# Patient Record
Sex: FEMALE | Race: BLACK OR AFRICAN AMERICAN | NOT HISPANIC OR LATINO | Employment: OTHER | URBAN - METROPOLITAN AREA
[De-identification: names, ages, dates, MRNs, and addresses within clinical notes are randomized per-mention and may not be internally consistent; named-entity substitution may affect disease eponyms.]

---

## 2019-07-24 ENCOUNTER — HOSPITAL ENCOUNTER (OUTPATIENT)
Dept: RADIOLOGY | Facility: OTHER | Age: 79
Discharge: HOME | End: 2019-07-24
Attending: INTERNAL MEDICINE
Payer: MEDICARE

## 2019-07-24 DIAGNOSIS — R10.33 ABDOMINAL CRAMPING, PERIUMBILICAL: ICD-10-CM

## 2019-07-24 DIAGNOSIS — M89.49 OSTEOARTHROSIS INVOLVING MULTIPLE SITES BUT NOT DESIGNATED AS GENERALIZED: ICD-10-CM

## 2019-07-24 DIAGNOSIS — R10.13 ABDOMINAL PAIN, EPIGASTRIC: ICD-10-CM

## 2019-07-24 DIAGNOSIS — R14.0 ABDOMINAL DISTENSION: ICD-10-CM

## 2019-07-24 DIAGNOSIS — K59.00 CN (CONSTIPATION): ICD-10-CM

## 2019-07-24 PROCEDURE — 76700 US ABDOMEN COMPLETE: ICD-10-PCS | Mod: 26,,, | Performed by: RADIOLOGY

## 2019-07-24 PROCEDURE — 72070 XR THORACIC SPINE AP LATERAL: ICD-10-PCS | Mod: 26,,, | Performed by: RADIOLOGY

## 2019-07-24 PROCEDURE — 72100 XR LUMBAR SPINE AP AND LATERAL: ICD-10-PCS | Mod: 26,,, | Performed by: RADIOLOGY

## 2019-07-24 PROCEDURE — 72070 X-RAY EXAM THORAC SPINE 2VWS: CPT | Mod: 26,,, | Performed by: RADIOLOGY

## 2019-07-24 PROCEDURE — 76700 US EXAM ABDOM COMPLETE: CPT | Mod: TC

## 2019-07-24 PROCEDURE — 72100 X-RAY EXAM L-S SPINE 2/3 VWS: CPT | Mod: TC,FY

## 2019-07-24 PROCEDURE — 72070 X-RAY EXAM THORAC SPINE 2VWS: CPT | Mod: TC,FY

## 2019-07-24 PROCEDURE — 72170 X-RAY EXAM OF PELVIS: CPT | Mod: TC,FY

## 2019-07-24 PROCEDURE — 72100 X-RAY EXAM L-S SPINE 2/3 VWS: CPT | Mod: 26,,, | Performed by: RADIOLOGY

## 2019-07-24 PROCEDURE — 72170 XR PELVIS ROUTINE AP: ICD-10-PCS | Mod: 26,,, | Performed by: RADIOLOGY

## 2019-07-24 PROCEDURE — 76700 US EXAM ABDOM COMPLETE: CPT | Mod: 26,,, | Performed by: RADIOLOGY

## 2019-07-24 PROCEDURE — 72170 X-RAY EXAM OF PELVIS: CPT | Mod: 26,,, | Performed by: RADIOLOGY

## 2021-11-30 ENCOUNTER — OFFICE VISIT (OUTPATIENT)
Dept: CARDIOLOGY | Facility: CLINIC | Age: 81
End: 2021-11-30
Attending: INTERNAL MEDICINE
Payer: MEDICARE

## 2021-11-30 VITALS
SYSTOLIC BLOOD PRESSURE: 121 MMHG | HEIGHT: 63 IN | BODY MASS INDEX: 31.64 KG/M2 | WEIGHT: 178.56 LBS | HEART RATE: 98 BPM | DIASTOLIC BLOOD PRESSURE: 71 MMHG

## 2021-11-30 DIAGNOSIS — I10 PRIMARY HYPERTENSION: ICD-10-CM

## 2021-11-30 DIAGNOSIS — E66.9 MILD OBESITY: ICD-10-CM

## 2021-11-30 DIAGNOSIS — E03.9 ACQUIRED HYPOTHYROIDISM: ICD-10-CM

## 2021-11-30 DIAGNOSIS — E04.2 MULTINODULAR GOITER (NONTOXIC): ICD-10-CM

## 2021-11-30 DIAGNOSIS — E78.00 HYPERCHOLESTEROLEMIA: ICD-10-CM

## 2021-11-30 DIAGNOSIS — R06.02 SHORTNESS OF BREATH: ICD-10-CM

## 2021-11-30 DIAGNOSIS — R73.03 PREDIABETES: ICD-10-CM

## 2021-11-30 PROCEDURE — 99204 OFFICE O/P NEW MOD 45 MIN: CPT | Mod: 25,S$GLB,, | Performed by: INTERNAL MEDICINE

## 2021-11-30 PROCEDURE — 99999 PR PBB SHADOW E&M-EST. PATIENT-LVL III: CPT | Mod: PBBFAC,,, | Performed by: INTERNAL MEDICINE

## 2021-11-30 PROCEDURE — 93000 PR ELECTROCARDIOGRAM, COMPLETE: ICD-10-PCS | Mod: S$GLB,,, | Performed by: INTERNAL MEDICINE

## 2021-11-30 PROCEDURE — 93000 ELECTROCARDIOGRAM COMPLETE: CPT | Mod: S$GLB,,, | Performed by: INTERNAL MEDICINE

## 2021-11-30 PROCEDURE — 93005 ELECTROCARDIOGRAM TRACING: CPT

## 2021-11-30 PROCEDURE — 99204 PR OFFICE/OUTPT VISIT, NEW, LEVL IV, 45-59 MIN: ICD-10-PCS | Mod: 25,S$GLB,, | Performed by: INTERNAL MEDICINE

## 2021-11-30 PROCEDURE — 99999 PR PBB SHADOW E&M-EST. PATIENT-LVL III: ICD-10-PCS | Mod: PBBFAC,,, | Performed by: INTERNAL MEDICINE

## 2021-11-30 RX ORDER — ERGOCALCIFEROL 1.25 MG/1
50000 CAPSULE ORAL
COMMUNITY

## 2021-11-30 RX ORDER — ASPIRIN 325 MG
50 TABLET, DELAYED RELEASE (ENTERIC COATED) ORAL DAILY
COMMUNITY

## 2021-11-30 RX ORDER — PLECANATIDE 3 MG/1
TABLET ORAL
COMMUNITY

## 2021-11-30 RX ORDER — POLYETHYLENE GLYCOL 3350 17 G/17G
POWDER, FOR SOLUTION ORAL
COMMUNITY

## 2021-12-02 ENCOUNTER — HOSPITAL ENCOUNTER (OUTPATIENT)
Dept: RADIOLOGY | Facility: OTHER | Age: 81
Discharge: HOME | End: 2021-12-02
Attending: INTERNAL MEDICINE
Payer: MEDICARE

## 2021-12-02 DIAGNOSIS — R06.02 SHORTNESS OF BREATH: ICD-10-CM

## 2021-12-02 PROCEDURE — 71046 XR CHEST PA AND LATERAL: ICD-10-PCS | Mod: 26,,, | Performed by: RADIOLOGY

## 2021-12-02 PROCEDURE — 71046 X-RAY EXAM CHEST 2 VIEWS: CPT | Mod: 26,,, | Performed by: RADIOLOGY

## 2021-12-02 PROCEDURE — 71046 X-RAY EXAM CHEST 2 VIEWS: CPT | Mod: TC,FY

## 2021-12-28 ENCOUNTER — HOSPITAL ENCOUNTER (OUTPATIENT)
Dept: CARDIOLOGY | Facility: OTHER | Age: 81
Discharge: HOME | End: 2021-12-28
Attending: INTERNAL MEDICINE
Payer: MEDICARE

## 2021-12-28 VITALS
WEIGHT: 178 LBS | DIASTOLIC BLOOD PRESSURE: 71 MMHG | HEIGHT: 62 IN | SYSTOLIC BLOOD PRESSURE: 121 MMHG | BODY MASS INDEX: 32.76 KG/M2

## 2021-12-28 VITALS
BODY MASS INDEX: 32.76 KG/M2 | DIASTOLIC BLOOD PRESSURE: 71 MMHG | HEART RATE: 72 BPM | SYSTOLIC BLOOD PRESSURE: 118 MMHG | WEIGHT: 178 LBS | HEIGHT: 62 IN

## 2021-12-28 DIAGNOSIS — R06.02 SHORTNESS OF BREATH: ICD-10-CM

## 2021-12-28 PROCEDURE — 93306 ECHO (CUPID ONLY): ICD-10-PCS | Mod: 26,,, | Performed by: INTERNAL MEDICINE

## 2021-12-28 PROCEDURE — 93306 TTE W/DOPPLER COMPLETE: CPT

## 2021-12-28 PROCEDURE — 93018 CV STRESS TEST I&R ONLY: CPT | Mod: ,,, | Performed by: INTERNAL MEDICINE

## 2021-12-28 PROCEDURE — 93016 EXERCISE STRESS - EKG (CUPID ONLY): ICD-10-PCS | Mod: ,,, | Performed by: INTERNAL MEDICINE

## 2021-12-28 PROCEDURE — 93306 TTE W/DOPPLER COMPLETE: CPT | Mod: 26,,, | Performed by: INTERNAL MEDICINE

## 2021-12-28 PROCEDURE — 93018 EXERCISE STRESS - EKG (CUPID ONLY): ICD-10-PCS | Mod: ,,, | Performed by: INTERNAL MEDICINE

## 2021-12-28 PROCEDURE — 93017 CV STRESS TEST TRACING ONLY: CPT

## 2021-12-28 PROCEDURE — 93016 CV STRESS TEST SUPVJ ONLY: CPT | Mod: ,,, | Performed by: INTERNAL MEDICINE

## 2022-01-03 LAB
ASCENDING AORTA: 2.59 CM
AV INDEX (PROSTH): 0.81
AV MEAN GRADIENT: 4 MMHG
AV PEAK GRADIENT: 7 MMHG
AV VALVE AREA: 2.23 CM2
AV VELOCITY RATIO: 0.89
BSA FOR ECHO PROCEDURE: 1.88 M2
CV ECHO LV RWT: 0.49 CM
CV STRESS BASE HR: 72 BPM
DIASTOLIC BLOOD PRESSURE: 71 MMHG
DOP CALC AO PEAK VEL: 1.32 M/S
DOP CALC AO VTI: 27.79 CM
DOP CALC LVOT AREA: 2.7 CM2
DOP CALC LVOT DIAMETER: 1.87 CM
DOP CALC LVOT PEAK VEL: 1.18 M/S
DOP CALC LVOT STROKE VOLUME: 61.98 CM3
DOP CALCLVOT PEAK VEL VTI: 22.58 CM
E WAVE DECELERATION TIME: 200.19 MSEC
E/A RATIO: 0.68
E/E' RATIO: 12.8 M/S
ECHO LV POSTERIOR WALL: 0.88 CM (ref 0.6–1.1)
EJECTION FRACTION: 65 %
FRACTIONAL SHORTENING: 40 % (ref 28–44)
INTERVENTRICULAR SEPTUM: 0.8 CM (ref 0.6–1.1)
IVRT: 76.12 MSEC
LA MAJOR: 3.52 CM
LA MINOR: 4.19 CM
LA WIDTH: 2.97 CM
LEFT ATRIUM SIZE: 2.72 CM
LEFT ATRIUM VOLUME INDEX MOD: 12.9 ML/M2
LEFT ATRIUM VOLUME INDEX: 14.4 ML/M2
LEFT ATRIUM VOLUME MOD: 23.45 CM3
LEFT ATRIUM VOLUME: 26.27 CM3
LEFT INTERNAL DIMENSION IN SYSTOLE: 2.17 CM (ref 2.1–4)
LEFT VENTRICLE DIASTOLIC VOLUME INDEX: 29.65 ML/M2
LEFT VENTRICLE DIASTOLIC VOLUME: 53.97 ML
LEFT VENTRICLE MASS INDEX: 46 G/M2
LEFT VENTRICLE SYSTOLIC VOLUME INDEX: 8.6 ML/M2
LEFT VENTRICLE SYSTOLIC VOLUME: 15.62 ML
LEFT VENTRICULAR INTERNAL DIMENSION IN DIASTOLE: 3.59 CM (ref 3.5–6)
LEFT VENTRICULAR MASS: 83.88 G
LV LATERAL E/E' RATIO: 13.71 M/S
LV SEPTAL E/E' RATIO: 12 M/S
MV A" WAVE DURATION": 12.46 MSEC
MV PEAK A VEL: 1.42 M/S
MV PEAK E VEL: 0.96 M/S
MV STENOSIS PRESSURE HALF TIME: 58.05 MS
MV VALVE AREA P 1/2 METHOD: 3.79 CM2
OHS CV CPX 85 PERCENT MAX PREDICTED HEART RATE MALE: 115
OHS CV CPX ESTIMATED METS: 7
OHS CV CPX MAX PREDICTED HEART RATE: 135
OHS CV CPX PATIENT IS FEMALE: 1
OHS CV CPX PATIENT IS MALE: 0
OHS CV CPX RATE PRESSURE PRODUCT PRESENTING: 8496
PISA TR MAX VEL: 2.41 M/S
PULM VEIN S/D RATIO: 1.2
PV PEAK D VEL: 0.4 M/S
PV PEAK S VEL: 0.48 M/S
PV PEAK VELOCITY: 0.98 CM/S
RA MAJOR: 3.41 CM
RA PRESSURE: 3 MMHG
RA WIDTH: 2.74 CM
RIGHT VENTRICULAR END-DIASTOLIC DIMENSION: 2.99 CM
RV TISSUE DOPPLER FREE WALL SYSTOLIC VELOCITY 1 (APICAL 4 CHAMBER VIEW): 17.06 CM/S
SINUS: 2.8 CM
STJ: 2.3 CM
STRESS ECHO POST EXERCISE DUR MIN: 5 MINUTES
STRESS ECHO POST EXERCISE DUR SEC: 15 SECONDS
SYSTOLIC BLOOD PRESSURE: 118 MMHG
TDI LATERAL: 0.07 M/S
TDI SEPTAL: 0.08 M/S
TDI: 0.08 M/S
TR MAX PG: 23 MMHG
TRICUSPID ANNULAR PLANE SYSTOLIC EXCURSION: 2.33 CM
TV REST PULMONARY ARTERY PRESSURE: 26 MMHG

## 2022-01-25 ENCOUNTER — OFFICE VISIT (OUTPATIENT)
Dept: CARDIOLOGY | Facility: CLINIC | Age: 82
End: 2022-01-25
Attending: INTERNAL MEDICINE
Payer: MEDICARE

## 2022-01-25 VITALS
BODY MASS INDEX: 33.1 KG/M2 | OXYGEN SATURATION: 98 % | DIASTOLIC BLOOD PRESSURE: 59 MMHG | HEART RATE: 81 BPM | SYSTOLIC BLOOD PRESSURE: 123 MMHG | HEIGHT: 62 IN | WEIGHT: 179.88 LBS

## 2022-01-25 DIAGNOSIS — I10 PRIMARY HYPERTENSION: ICD-10-CM

## 2022-01-25 DIAGNOSIS — E21.3 HYPERPARATHYROIDISM: ICD-10-CM

## 2022-01-25 DIAGNOSIS — E03.9 ACQUIRED HYPOTHYROIDISM: ICD-10-CM

## 2022-01-25 DIAGNOSIS — E04.2 MULTINODULAR GOITER: ICD-10-CM

## 2022-01-25 DIAGNOSIS — R06.02 SHORTNESS OF BREATH: ICD-10-CM

## 2022-01-25 DIAGNOSIS — R73.03 PREDIABETES: ICD-10-CM

## 2022-01-25 DIAGNOSIS — E78.00 HYPERCHOLESTEROLEMIA: ICD-10-CM

## 2022-01-25 DIAGNOSIS — E66.9 MILD OBESITY: ICD-10-CM

## 2022-01-25 PROCEDURE — 3078F DIAST BP <80 MM HG: CPT | Mod: CPTII,S$GLB,, | Performed by: INTERNAL MEDICINE

## 2022-01-25 PROCEDURE — 3078F PR MOST RECENT DIASTOLIC BLOOD PRESSURE < 80 MM HG: ICD-10-PCS | Mod: CPTII,S$GLB,, | Performed by: INTERNAL MEDICINE

## 2022-01-25 PROCEDURE — 1160F RVW MEDS BY RX/DR IN RCRD: CPT | Mod: CPTII,S$GLB,, | Performed by: INTERNAL MEDICINE

## 2022-01-25 PROCEDURE — 1160F PR REVIEW ALL MEDS BY PRESCRIBER/CLIN PHARMACIST DOCUMENTED: ICD-10-PCS | Mod: CPTII,S$GLB,, | Performed by: INTERNAL MEDICINE

## 2022-01-25 PROCEDURE — 3074F PR MOST RECENT SYSTOLIC BLOOD PRESSURE < 130 MM HG: ICD-10-PCS | Mod: CPTII,S$GLB,, | Performed by: INTERNAL MEDICINE

## 2022-01-25 PROCEDURE — 99999 PR PBB SHADOW E&M-EST. PATIENT-LVL III: ICD-10-PCS | Mod: PBBFAC,,, | Performed by: INTERNAL MEDICINE

## 2022-01-25 PROCEDURE — 99214 OFFICE O/P EST MOD 30 MIN: CPT | Mod: S$GLB,,, | Performed by: INTERNAL MEDICINE

## 2022-01-25 PROCEDURE — 3074F SYST BP LT 130 MM HG: CPT | Mod: CPTII,S$GLB,, | Performed by: INTERNAL MEDICINE

## 2022-01-25 PROCEDURE — 1159F MED LIST DOCD IN RCRD: CPT | Mod: CPTII,S$GLB,, | Performed by: INTERNAL MEDICINE

## 2022-01-25 PROCEDURE — 99999 PR PBB SHADOW E&M-EST. PATIENT-LVL III: CPT | Mod: PBBFAC,,, | Performed by: INTERNAL MEDICINE

## 2022-01-25 PROCEDURE — 99214 PR OFFICE/OUTPT VISIT, EST, LEVL IV, 30-39 MIN: ICD-10-PCS | Mod: S$GLB,,, | Performed by: INTERNAL MEDICINE

## 2022-01-25 PROCEDURE — 1159F PR MEDICATION LIST DOCUMENTED IN MEDICAL RECORD: ICD-10-PCS | Mod: CPTII,S$GLB,, | Performed by: INTERNAL MEDICINE

## 2022-01-25 RX ORDER — AMLODIPINE BESYLATE 5 MG/1
5 TABLET ORAL DAILY
Qty: 90 TABLET | Refills: 3 | Status: SHIPPED | OUTPATIENT
Start: 2022-01-25 | End: 2022-03-29 | Stop reason: SDUPTHER

## 2022-01-25 RX ORDER — QUINAPRIL 40 MG/1
40 TABLET ORAL DAILY
Qty: 90 TABLET | Refills: 3 | Status: SHIPPED | OUTPATIENT
Start: 2022-01-25 | End: 2022-03-29 | Stop reason: SDUPTHER

## 2022-01-25 NOTE — PROGRESS NOTES
Subjective:     Xuan Jasmine is a 81 y.o. female with hypertension, hypercholesterolemia and prediabetes. She is mildly obese. In 1994 when stationed in [place] she was diagnosed with hypothyroidism and having thyroid nodules. In 2022 she tells me she has been diagnosed with hyperparathyroidism. In 2019 she began to experinece exertional shortness of breath. On 12/28/2021 she had an echocardiogram that revealed normal left ventricular size and systolic function. The ejection fraction was 65%. The was left concentric remodeling and mild diastolic dysfunction. On 12/28/2021 she had a stress electrocardiogram and was able to do 5:15 minutes on the treadmill. She became short of breath but she had no chest pain. The electrocardiogram was negative. Accordingly it did not appear her exertional shortness of breath had a cardiac cause. She denies any chest pain, palpitations or weak spells. She is feeling well. In 2022 she is being evaluated for hyperparathyroidism.      Shortness of Breath  This is a chronic problem. The current episode started more than 1 year ago. Pertinent negatives include no abdominal pain, chest pain, claudication, coryza, ear pain, fever, headaches, hemoptysis, leg pain, leg swelling, neck pain, orthopnea, PND, rash, rhinorrhea, sore throat, sputum production, swollen glands, syncope, vomiting or wheezing.   Hypertension  This is a chronic problem. The current episode started more than 1 year ago. The problem is unchanged. The problem is controlled (usually 120-130/70-80 mmHg at home). Associated symptoms include peripheral edema and shortness of breath. Pertinent negatives include no anxiety, blurred vision, chest pain, headaches, malaise/fatigue, neck pain, orthopnea, palpitations, PND or sweats. There is no history of chronic renal disease.   Hyperlipidemia  Exacerbating diseases include hypothyroidism and obesity. She has no history of chronic renal disease, diabetes, liver disease or  nephrotic syndrome. Associated symptoms include shortness of breath. Pertinent negatives include no chest pain, focal sensory loss, focal weakness, leg pain or myalgias.       Review of Systems   Constitutional: Negative for chills, fever and malaise/fatigue.   HENT: Negative for ear pain, nosebleeds, rhinorrhea, sore throat and tinnitus.    Eyes: Negative for blurred vision, double vision, vision loss in left eye and vision loss in right eye.   Cardiovascular: Positive for dyspnea on exertion. Negative for chest pain, claudication, irregular heartbeat, leg swelling, near-syncope, orthopnea, palpitations, paroxysmal nocturnal dyspnea and syncope.   Respiratory: Positive for shortness of breath. Negative for cough, hemoptysis, sputum production and wheezing.    Endocrine: Negative for cold intolerance and heat intolerance.   Hematologic/Lymphatic: Negative for bleeding problem. Does not bruise/bleed easily.   Skin: Negative for color change and rash.   Musculoskeletal: Negative for back pain, falls, muscle weakness, myalgias and neck pain.   Gastrointestinal: Negative for abdominal pain, diarrhea, dysphagia, heartburn, hematemesis, hematochezia, hemorrhoids, jaundice, melena, nausea and vomiting.   Genitourinary: Negative for dysuria and hematuria.   Neurological: Negative for dizziness, focal weakness, headaches, light-headedness, loss of balance, numbness, tremors, vertigo and weakness.   Psychiatric/Behavioral: Negative for altered mental status, depression and memory loss. The patient is not nervous/anxious.    Allergic/Immunologic: Negative for hives and persistent infections.       Current Outpatient Medications on File Prior to Visit   Medication Sig Dispense Refill    amlodipine (NORVASC) 5 MG tablet Take 5 mg by mouth once daily.      co-enzyme Q-10 50 mg capsule Take 50 mg by mouth once daily.      ergocalciferol (ERGOCALCIFEROL) 50,000 unit Cap Take 50,000 Units by mouth every 7 days.      FLAXSEED OIL  "ORAL Take by mouth.      plecanatide (TRULANCE) 3 mg Tab Take by mouth.      polyethylene glycol (GLYCOLAX) 17 gram PwPk Take by mouth.      quinapril (ACCUPRIL) 40 MG tablet Take 40 mg by mouth once daily.       No current facility-administered medications on file prior to visit.       BP (!) 123/59 (BP Location: Left arm, Patient Position: Sitting, BP Method: Large (Automatic))   Pulse 81   Ht 5' 2" (1.575 m)   Wt 81.6 kg (179 lb 14.3 oz)   SpO2 98%   BMI 32.90 kg/m²       Objective:     Physical Exam  Constitutional:       General: She is not in acute distress.     Appearance: Normal appearance. She is well-developed. She is not toxic-appearing or diaphoretic.   HENT:      Head: Normocephalic and atraumatic.      Nose: Nose normal.   Eyes:      General:         Right eye: No discharge.         Left eye: No discharge.      Conjunctiva/sclera:      Right eye: Right conjunctiva is not injected.      Left eye: Left conjunctiva is not injected.      Pupils: Pupils are equal.      Right eye: Pupil is round.      Left eye: Pupil is round.   Neck:      Thyroid: No thyromegaly.      Vascular: No carotid bruit or JVD.   Cardiovascular:      Rate and Rhythm: Normal rate and regular rhythm.  No extrasystoles are present.     Chest Wall: PMI is not displaced.      Pulses:           Radial pulses are 2+ on the right side and 2+ on the left side.        Femoral pulses are 2+ on the right side and 2+ on the left side.       Dorsalis pedis pulses are 2+ on the right side and 2+ on the left side.        Posterior tibial pulses are 2+ on the right side and 2+ on the left side.      Heart sounds: S1 normal and S2 normal. Murmur heard.    Medium-pitched midsystolic murmur is present at the upper right sternal border.  Gallop present. S4 sounds present.    Pulmonary:      Effort: Pulmonary effort is normal.      Breath sounds: Normal breath sounds.   Abdominal:      Palpations: Abdomen is soft.      Tenderness: There is no " abdominal tenderness.   Musculoskeletal:      Cervical back: Neck supple.      Right lower leg: Normal. No swelling. No edema.      Left lower leg: Normal. No swelling. No edema.   Lymphadenopathy:      Head:      Right side of head: No submandibular adenopathy.      Left side of head: No submandibular adenopathy.      Cervical: No cervical adenopathy.   Skin:     General: Skin is warm and dry.      Findings: No rash.      Nails: There is no clubbing.   Neurological:      General: No focal deficit present.      Mental Status: She is alert and oriented to person, place, and time. She is not disoriented.      Cranial Nerves: No cranial nerve deficit.   Psychiatric:         Attention and Perception: Attention and perception normal.         Mood and Affect: Mood and affect normal.         Speech: Speech normal.         Behavior: Behavior normal.         Thought Content: Thought content normal.         Cognition and Memory: Cognition and memory normal.         Judgment: Judgment normal.         Assessment:     1. Shortness of breath    2. Primary hypertension    3. Hypercholesterolemia    4. Prediabetes    5. Mild obesity    6. Multinodular goiter    7. Acquired hypothyroidism    8. Hyperparathyroidism         Plan:     1. Shortness of Breath   : Began to experience exertional shortness of breath.   2021: Echo: Normal left ventricular size and systolic function. EF 65%. LVCR. Mild diastolic dysfunction.    2021: Stress EC:15 min. SOB. No CP. ECG negative.   2021: CXR: Negative.   Does not appear her exertional SOB has a cardiac cause.   Suspect main reason is her weight and recent weight gain.     2. Hypertension   : Diagnosed.    On amlodipine 5 mg Q24 and quinapril 40 mg Q24.   Keeping log at home.   Well controlled.    3. Hypercholesterolemia   : Diagnosed. Statin was begun.    Pravastatin was discontinued due to poor urination and chest pain.   Ezetimibe was discontinued due to chest  pain.    4. Prediabetes    2019: Diagnosed.    Diet.    5. Mild Obesity   11/30/2021: Weight 81 kg. BMI 32.   Encouraged to lose weight.    6. Thyroid Nodules   1964: Diagnosed.    Dr. Martinez at [facility].    7. Hypothyroidism   1964: Diagnosed.    Dr. Martinez at [facility].    8. Hyperparathyroidism   12/30/2021: Ca 10.5. PTH 85.   Evaluation in progress.    9. Primary Care   Dr. Roseline Amos.    F/u 4 months.    Te Gilbert M.D.      2/24/2022 4:22 PM, Addendum:    I have been informed about plans for surgery - parathyroidectomy - [facility] - Dr. Iraida Rooney.    Appears cardiac risk with planned surgery is acceptable.    Te Gilbert M.D.       Copy:    Dr. Iraida Rooney.

## 2022-03-29 ENCOUNTER — OFFICE VISIT (OUTPATIENT)
Dept: CARDIOLOGY | Facility: CLINIC | Age: 82
End: 2022-03-29
Attending: INTERNAL MEDICINE
Payer: MEDICARE

## 2022-03-29 VITALS
HEART RATE: 87 BPM | OXYGEN SATURATION: 96 % | DIASTOLIC BLOOD PRESSURE: 70 MMHG | SYSTOLIC BLOOD PRESSURE: 120 MMHG | WEIGHT: 180.31 LBS | BODY MASS INDEX: 33.18 KG/M2 | HEIGHT: 62 IN

## 2022-03-29 DIAGNOSIS — Z01.810 PRE-OPERATIVE CARDIOVASCULAR EXAMINATION: ICD-10-CM

## 2022-03-29 DIAGNOSIS — R73.03 PREDIABETES: ICD-10-CM

## 2022-03-29 DIAGNOSIS — E03.9 ACQUIRED HYPOTHYROIDISM: ICD-10-CM

## 2022-03-29 DIAGNOSIS — E78.00 HYPERCHOLESTEROLEMIA: ICD-10-CM

## 2022-03-29 DIAGNOSIS — E66.9 MILD OBESITY: ICD-10-CM

## 2022-03-29 DIAGNOSIS — R06.02 SHORTNESS OF BREATH: ICD-10-CM

## 2022-03-29 DIAGNOSIS — E21.3 HYPERPARATHYROIDISM: ICD-10-CM

## 2022-03-29 DIAGNOSIS — I10 PRIMARY HYPERTENSION: ICD-10-CM

## 2022-03-29 DIAGNOSIS — E04.2 MULTINODULAR GOITER: ICD-10-CM

## 2022-03-29 PROCEDURE — 3074F PR MOST RECENT SYSTOLIC BLOOD PRESSURE < 130 MM HG: ICD-10-PCS | Mod: CPTII,S$GLB,, | Performed by: INTERNAL MEDICINE

## 2022-03-29 PROCEDURE — 3078F DIAST BP <80 MM HG: CPT | Mod: CPTII,S$GLB,, | Performed by: INTERNAL MEDICINE

## 2022-03-29 PROCEDURE — 1160F PR REVIEW ALL MEDS BY PRESCRIBER/CLIN PHARMACIST DOCUMENTED: ICD-10-PCS | Mod: CPTII,S$GLB,, | Performed by: INTERNAL MEDICINE

## 2022-03-29 PROCEDURE — 99999 PR PBB SHADOW E&M-EST. PATIENT-LVL III: ICD-10-PCS | Mod: PBBFAC,,, | Performed by: INTERNAL MEDICINE

## 2022-03-29 PROCEDURE — 1159F PR MEDICATION LIST DOCUMENTED IN MEDICAL RECORD: ICD-10-PCS | Mod: CPTII,S$GLB,, | Performed by: INTERNAL MEDICINE

## 2022-03-29 PROCEDURE — 3078F PR MOST RECENT DIASTOLIC BLOOD PRESSURE < 80 MM HG: ICD-10-PCS | Mod: CPTII,S$GLB,, | Performed by: INTERNAL MEDICINE

## 2022-03-29 PROCEDURE — 99999 PR PBB SHADOW E&M-EST. PATIENT-LVL III: CPT | Mod: PBBFAC,,, | Performed by: INTERNAL MEDICINE

## 2022-03-29 PROCEDURE — 99214 OFFICE O/P EST MOD 30 MIN: CPT | Mod: S$GLB,,, | Performed by: INTERNAL MEDICINE

## 2022-03-29 PROCEDURE — 1159F MED LIST DOCD IN RCRD: CPT | Mod: CPTII,S$GLB,, | Performed by: INTERNAL MEDICINE

## 2022-03-29 PROCEDURE — 99214 PR OFFICE/OUTPT VISIT, EST, LEVL IV, 30-39 MIN: ICD-10-PCS | Mod: S$GLB,,, | Performed by: INTERNAL MEDICINE

## 2022-03-29 PROCEDURE — 1160F RVW MEDS BY RX/DR IN RCRD: CPT | Mod: CPTII,S$GLB,, | Performed by: INTERNAL MEDICINE

## 2022-03-29 PROCEDURE — 3074F SYST BP LT 130 MM HG: CPT | Mod: CPTII,S$GLB,, | Performed by: INTERNAL MEDICINE

## 2022-03-29 RX ORDER — QUINAPRIL 40 MG/1
40 TABLET ORAL DAILY
Qty: 90 TABLET | Refills: 3 | Status: SHIPPED | OUTPATIENT
Start: 2022-03-29 | End: 2022-05-09

## 2022-03-29 RX ORDER — AMLODIPINE BESYLATE 5 MG/1
5 TABLET ORAL DAILY
Qty: 90 TABLET | Refills: 3 | Status: SHIPPED | OUTPATIENT
Start: 2022-03-29 | End: 2023-03-31 | Stop reason: SDUPTHER

## 2022-03-29 NOTE — PROGRESS NOTES
Subjective:     Xuan Jasmine is a 81 y.o. female with hypertension, hypercholesterolemia and prediabetes. She is mildly obese. In 1994 when stationed in [place] she was diagnosed with hypothyroidism and having thyroid nodules. In 2022 she tells me she has been diagnosed with hyperparathyroidism. In 2019 she began to experinece exertional shortness of breath. On 12/28/2021 she had an echocardiogram that revealed normal left ventricular size and systolic function. The ejection fraction was 65%. The was left concentric remodeling and mild diastolic dysfunction. On 12/28/2021 she had a stress electrocardiogram and was able to do 5:15 minutes on the treadmill. She became short of breath but she had no chest pain. The electrocardiogram was negative. Accordingly it did not appear her exertional shortness of breath had a cardiac cause. She denies any chest pain, palpitations or weak spells. No issues with any of her prescribed medications. She is feeling well. In 4/2022 she has plans for parathyroidectomy at [facility].      Shortness of Breath  This is a chronic problem. The current episode started more than 1 year ago. Pertinent negatives include no abdominal pain, chest pain, claudication, coryza, ear pain, fever, headaches, hemoptysis, leg pain, leg swelling, neck pain, orthopnea, PND, rash, rhinorrhea, sore throat, sputum production, swollen glands, syncope, vomiting or wheezing.   Hypertension  This is a chronic problem. The current episode started more than 1 year ago. The problem is unchanged. The problem is controlled (usually 120-130/70-80 mmHg at home). Associated symptoms include peripheral edema and shortness of breath. Pertinent negatives include no anxiety, blurred vision, chest pain, headaches, malaise/fatigue, neck pain, orthopnea, palpitations, PND or sweats. There is no history of chronic renal disease.   Hyperlipidemia  Exacerbating diseases include hypothyroidism and obesity. She has no history of  chronic renal disease, diabetes, liver disease or nephrotic syndrome. Associated symptoms include shortness of breath. Pertinent negatives include no chest pain, focal sensory loss, focal weakness, leg pain or myalgias.       Review of Systems   Constitutional: Negative for chills, fever and malaise/fatigue.   HENT: Negative for ear pain, nosebleeds, rhinorrhea, sore throat and tinnitus.    Eyes: Negative for blurred vision, double vision, vision loss in left eye and vision loss in right eye.   Cardiovascular: Positive for dyspnea on exertion. Negative for chest pain, claudication, irregular heartbeat, leg swelling, near-syncope, orthopnea, palpitations, paroxysmal nocturnal dyspnea and syncope.   Respiratory: Positive for shortness of breath. Negative for cough, hemoptysis, sputum production and wheezing.    Endocrine: Negative for cold intolerance and heat intolerance.   Hematologic/Lymphatic: Negative for bleeding problem. Does not bruise/bleed easily.   Skin: Negative for color change and rash.   Musculoskeletal: Negative for back pain, falls, muscle weakness, myalgias and neck pain.   Gastrointestinal: Negative for abdominal pain, diarrhea, dysphagia, heartburn, hematemesis, hematochezia, hemorrhoids, jaundice, melena, nausea and vomiting.   Genitourinary: Negative for dysuria and hematuria.   Neurological: Negative for dizziness, focal weakness, headaches, light-headedness, loss of balance, numbness, tremors, vertigo and weakness.   Psychiatric/Behavioral: Negative for altered mental status, depression and memory loss. The patient is not nervous/anxious.    Allergic/Immunologic: Negative for hives and persistent infections.       Current Outpatient Medications on File Prior to Visit   Medication Sig Dispense Refill    amLODIPine (NORVASC) 5 MG tablet Take 1 tablet (5 mg total) by mouth once daily. 90 tablet 3    co-enzyme Q-10 50 mg capsule Take 50 mg by mouth once daily.      ergocalciferol  "(ERGOCALCIFEROL) 50,000 unit Cap Take 50,000 Units by mouth every 7 days.      FLAXSEED OIL ORAL Take by mouth.      plecanatide (TRULANCE) 3 mg Tab Take by mouth.      polyethylene glycol (GLYCOLAX) 17 gram PwPk Take by mouth.      quinapriL (ACCUPRIL) 40 MG tablet Take 1 tablet (40 mg total) by mouth once daily. 90 tablet 3     No current facility-administered medications on file prior to visit.       /70 (BP Location: Right arm, Patient Position: Sitting)   Pulse 87   Ht 5' 2" (1.575 m)   Wt 81.8 kg (180 lb 5.4 oz)   SpO2 96%   BMI 32.98 kg/m²       Objective:     Physical Exam  Constitutional:       General: She is not in acute distress.     Appearance: Normal appearance. She is well-developed. She is not toxic-appearing or diaphoretic.   HENT:      Head: Normocephalic and atraumatic.      Nose: Nose normal.   Eyes:      General:         Right eye: No discharge.         Left eye: No discharge.      Conjunctiva/sclera:      Right eye: Right conjunctiva is not injected.      Left eye: Left conjunctiva is not injected.      Pupils: Pupils are equal.      Right eye: Pupil is round.      Left eye: Pupil is round.   Neck:      Thyroid: No thyromegaly.      Vascular: No carotid bruit or JVD.   Cardiovascular:      Rate and Rhythm: Normal rate and regular rhythm.  No extrasystoles are present.     Chest Wall: PMI is not displaced.      Pulses:           Radial pulses are 2+ on the right side and 2+ on the left side.        Femoral pulses are 2+ on the right side and 2+ on the left side.       Dorsalis pedis pulses are 2+ on the right side and 2+ on the left side.        Posterior tibial pulses are 2+ on the right side and 2+ on the left side.      Heart sounds: S1 normal and S2 normal. Murmur heard.    Medium-pitched midsystolic murmur is present at the upper right sternal border.    Gallop present. S4 sounds present.   Pulmonary:      Effort: Pulmonary effort is normal.      Breath sounds: Normal breath " sounds.   Abdominal:      Palpations: Abdomen is soft.      Tenderness: There is no abdominal tenderness.   Musculoskeletal:      Cervical back: Neck supple.      Right lower leg: Normal. No swelling. No edema.      Left lower leg: Normal. No swelling. No edema.   Lymphadenopathy:      Head:      Right side of head: No submandibular adenopathy.      Left side of head: No submandibular adenopathy.      Cervical: No cervical adenopathy.   Skin:     General: Skin is warm and dry.      Findings: No rash.      Nails: There is no clubbing.   Neurological:      General: No focal deficit present.      Mental Status: She is alert and oriented to person, place, and time. She is not disoriented.      Cranial Nerves: No cranial nerve deficit.   Psychiatric:         Attention and Perception: Attention and perception normal.         Mood and Affect: Mood and affect normal.         Speech: Speech normal.         Behavior: Behavior normal.         Thought Content: Thought content normal.         Cognition and Memory: Cognition and memory normal.         Judgment: Judgment normal.         Assessment:     1. Shortness of breath    2. Primary hypertension    3. Hypercholesterolemia    4. Prediabetes    5. Mild obesity    6. Multinodular goiter    7. Acquired hypothyroidism    8. Hyperparathyroidism    9. Pre-operative cardiovascular examination         Plan:     1. Shortness of Breath   2020: Began to experience exertional shortness of breath.   2021: Echo: Normal left ventricular size and systolic function. EF 65%. LVCR. Mild diastolic dysfunction.    2021: Stress EC:15 min. SOB. No CP. ECG negative.   2021: CXR: Negative.   Does not appear her exertional SOB has a cardiac cause.   Suspect main reason is her weight and recent weight gain.     2. Hypertension   : Diagnosed.    On amlodipine 5 mg Q24 and quinapril 40 mg Q24.   Keeping log at home.   Well controlled.    3. Hypercholesterolemia   2019: Diagnosed.  Statin was begun.    Pravastatin was discontinued due to poor urination and chest pain.   Ezetimibe was discontinued due to chest pain.    4. Prediabetes    2019: Diagnosed.    Diet.    5. Mild Obesity   11/30/2021: Weight 81 kg. BMI 32.   Encouraged to lose weight.    6. Thyroid Nodules   1964: Diagnosed.    Dr. Martinez at [facility].    7. Hypothyroidism   1964: Diagnosed.    Dr. Martinez at [facility].    8. Hyperparathyroidism   12/30/2021: Ca 10.5. PTH 85.   Evaluation in progress.    9. Primary Care   Dr. Roseline Amos.    10. Pre Operative Cardiovascular Evaluation   Plans for surgery - parathyroidectomy - [facility] - Dr. Iraida Rooney.   Appears cardiac risk with planned surgery is acceptable.    F/u 6 months.    Te Gilbert M.D.                 20

## 2022-04-27 ENCOUNTER — TELEPHONE (OUTPATIENT)
Dept: CARDIOLOGY | Facility: CLINIC | Age: 82
End: 2022-04-27
Payer: MEDICARE

## 2022-04-27 NOTE — TELEPHONE ENCOUNTER
Returned call to patient. No answer. Left a call back message.    ----- Message from Domenica Owen sent at 4/27/2022  3:54 PM CDT -----  Who Called: MARYLOU CARRILLO     What is the request in detail: Would like to speak to staff in regards to experiencing swelling in her feet and legs and believes the amLODIPine (NORVASC) 5 MG tablet is causing it. Please advise.     Can the clinic reply by [facility]?: No    Best Call Back Number: [phone]

## 2022-05-06 ENCOUNTER — TELEPHONE (OUTPATIENT)
Dept: CARDIOLOGY | Facility: CLINIC | Age: 82
End: 2022-05-06
Payer: MEDICARE

## 2022-05-06 NOTE — TELEPHONE ENCOUNTER
Spoke to Dr. Gilbert replace accupril with lisinopril 40 mg daily.  Rx called to [place] pharmacist at [facility] [phone].    Notified patient: patient states she cannot tolerate generic medication.  Patient will call the pharmacist to discuss.

## 2022-05-06 NOTE — TELEPHONE ENCOUNTER
"Patient called Accupril was recalled she returned her bottle to the pharmacy.  I spoke with the pharmacy and the " cannot supply" it at this time.    Patient is concerned because she is only taking amlodipine for her BP.    Do you want to prescribe another medicine?    Please advise.        ----- Message from Cindy Mccall sent at 5/6/2022  1:14 PM CDT -----  Name of Who is Calling: MARYLOU CARRILLO          What is the request in detail: The patient is calling to speak to the nurse in regards to her medication. Please advise          Can the clinic reply by [facility]: No         [facility]: [phone]      "

## 2022-05-09 DIAGNOSIS — I10 PRIMARY HYPERTENSION: Primary | ICD-10-CM

## 2022-05-09 RX ORDER — LOSARTAN POTASSIUM 100 MG/1
100 TABLET ORAL DAILY
Qty: 30 TABLET | Refills: 3 | Status: SHIPPED | OUTPATIENT
Start: 2022-05-09 | End: 2022-05-23

## 2022-05-09 NOTE — TELEPHONE ENCOUNTER
----- Message from Cindy Mccall sent at 5/9/2022 10:21 AM CDT -----  Name of Who is Calling: MARYLOU CARRILLO         What is the request in detail: The patient is calling to speak to the nurse in regards to a problem with medication. Please advise          Can the clinic reply by [facility]: No         What Number to Call Back if not in [facility]: [phone]

## 2022-05-09 NOTE — TELEPHONE ENCOUNTER
"Pt took Lisinopril yesterday. She states her whole body was hurting, she experienced heaviness in her chest and she was coughing last night. She would like to get a different medication and is requesting brand name. She states "Generic medication does not work for me".  "

## 2022-05-11 ENCOUNTER — TELEPHONE (OUTPATIENT)
Dept: CARDIOLOGY | Facility: CLINIC | Age: 82
End: 2022-05-11
Payer: MEDICARE

## 2022-05-11 NOTE — TELEPHONE ENCOUNTER
Returned call to patient. No answer. Left a call back message.    ----- Message from Ksenia Mcgrath sent at 5/11/2022  1:20 PM CDT -----  Regarding: Nurse Call  Name of Who is Calling: MARYLOU CARRILLO [[id number]]          What is the request in detail: Patient is requesting ac all back from nurse           Can the clinic reply by [facility]: No           What Number to Call Back if not in [facility]: [phone]

## 2022-05-19 ENCOUNTER — TELEPHONE (OUTPATIENT)
Dept: CARDIOLOGY | Facility: CLINIC | Age: 82
End: 2022-05-19
Payer: MEDICARE

## 2022-05-19 NOTE — TELEPHONE ENCOUNTER
Returned call to patient. No answer. Left a call back message.    ----- Message from Alex Briones sent at 5/19/2022  9:27 AM CDT -----  Regarding: Medication  Contact: MARYLOU CARRILLO [[id number]]  Name of Who is Calling: MARYLOU CARRILLO [[id number]]    What is the request in detail: Would like to speak with staff in regards to generic BP medications not helping and she is not taking them. Please advise      Can the clinic reply by [facility]: no      What [facility]: [phone] (pt requesting call after 1pm)

## 2022-05-20 ENCOUNTER — TELEPHONE (OUTPATIENT)
Dept: CARDIOLOGY | Facility: CLINIC | Age: 82
End: 2022-05-20
Payer: MEDICARE

## 2022-05-20 NOTE — TELEPHONE ENCOUNTER
"Patient has been taking only Norvasc; Accupril has been recalled.  BP readings: 102/57             123/66                         128/79                         134/82                         131/58  Patient states she cannot take losartan potassium  because she can "taste potassium" and heaviness to chest.  Lisinopril makes her whole body feel bad and heaviness to chest.  Patient states she cannot take generic medications.    Please advise.    ----- Message from Deana Hampton sent at 5/20/2022 11:54 AM CDT -----          Name of Who is Calling:   MARYLOU CARRILLO    Who Left The Message:   MARYLOU CARRILLO      What is the request in detail:    Patient called stating, "she's returning the Office's call and would like you to please call again."   Also patient prefers the name brand medication instead of generic Thank you!      Reply by [facility]: No    Preferred Called back:  [phone] (M)                                            "

## 2022-05-23 RX ORDER — ENALAPRIL MALEATE 2.5 MG/1
2.5 TABLET ORAL 2 TIMES DAILY
Qty: 60 TABLET | Refills: 11 | Status: SHIPPED | OUTPATIENT
Start: 2022-05-23 | End: 2022-05-27

## 2022-05-24 ENCOUNTER — TELEPHONE (OUTPATIENT)
Dept: CARDIOLOGY | Facility: CLINIC | Age: 82
End: 2022-05-24
Payer: MEDICARE

## 2022-05-24 NOTE — TELEPHONE ENCOUNTER
Returned call to patient. Have a lot of questions regarding her medications. Gave her an appointment to see DR. Gilbert regarding her medications.      ----- Message from Jo Burden sent at 5/24/2022  3:30 PM CDT -----  Regarding: medication price  Name of Who is Calling: Xuan           What is the request in detail: Patient is requesting a call back in regards to the medication,VASOTEC 2.5 mg tablet that cost $1100.           Can the clinic reply by [facility]: No           What Number to Call Back if not in [facility]: [phone]

## 2022-05-27 ENCOUNTER — OFFICE VISIT (OUTPATIENT)
Dept: CARDIOLOGY | Facility: CLINIC | Age: 82
End: 2022-05-27
Attending: INTERNAL MEDICINE
Payer: MEDICARE

## 2022-05-27 VITALS
SYSTOLIC BLOOD PRESSURE: 138 MMHG | OXYGEN SATURATION: 97 % | HEART RATE: 95 BPM | WEIGHT: 181.56 LBS | DIASTOLIC BLOOD PRESSURE: 80 MMHG | BODY MASS INDEX: 33.41 KG/M2 | HEIGHT: 62 IN

## 2022-05-27 DIAGNOSIS — R06.02 SHORTNESS OF BREATH: ICD-10-CM

## 2022-05-27 DIAGNOSIS — E03.9 ACQUIRED HYPOTHYROIDISM: ICD-10-CM

## 2022-05-27 DIAGNOSIS — E66.9 MILD OBESITY: ICD-10-CM

## 2022-05-27 DIAGNOSIS — E21.3 HYPERPARATHYROIDISM: ICD-10-CM

## 2022-05-27 DIAGNOSIS — E04.2 MULTINODULAR GOITER: ICD-10-CM

## 2022-05-27 DIAGNOSIS — R73.03 PREDIABETES: ICD-10-CM

## 2022-05-27 DIAGNOSIS — I10 PRIMARY HYPERTENSION: ICD-10-CM

## 2022-05-27 DIAGNOSIS — E78.00 HYPERCHOLESTEROLEMIA: ICD-10-CM

## 2022-05-27 PROBLEM — Z01.810 PRE-OPERATIVE CARDIOVASCULAR EXAMINATION: Status: RESOLVED | Noted: 2022-03-29 | Resolved: 2022-05-27

## 2022-05-27 PROCEDURE — 1160F PR REVIEW ALL MEDS BY PRESCRIBER/CLIN PHARMACIST DOCUMENTED: ICD-10-PCS | Mod: CPTII,S$GLB,, | Performed by: INTERNAL MEDICINE

## 2022-05-27 PROCEDURE — 99999 PR PBB SHADOW E&M-EST. PATIENT-LVL III: CPT | Mod: PBBFAC,,, | Performed by: INTERNAL MEDICINE

## 2022-05-27 PROCEDURE — 99214 PR OFFICE/OUTPT VISIT, EST, LEVL IV, 30-39 MIN: ICD-10-PCS | Mod: S$GLB,,, | Performed by: INTERNAL MEDICINE

## 2022-05-27 PROCEDURE — 1160F RVW MEDS BY RX/DR IN RCRD: CPT | Mod: CPTII,S$GLB,, | Performed by: INTERNAL MEDICINE

## 2022-05-27 PROCEDURE — 99214 OFFICE O/P EST MOD 30 MIN: CPT | Mod: S$GLB,,, | Performed by: INTERNAL MEDICINE

## 2022-05-27 PROCEDURE — 3075F PR MOST RECENT SYSTOLIC BLOOD PRESS GE 130-139MM HG: ICD-10-PCS | Mod: CPTII,S$GLB,, | Performed by: INTERNAL MEDICINE

## 2022-05-27 PROCEDURE — 1159F MED LIST DOCD IN RCRD: CPT | Mod: CPTII,S$GLB,, | Performed by: INTERNAL MEDICINE

## 2022-05-27 PROCEDURE — 3079F DIAST BP 80-89 MM HG: CPT | Mod: CPTII,S$GLB,, | Performed by: INTERNAL MEDICINE

## 2022-05-27 PROCEDURE — 99999 PR PBB SHADOW E&M-EST. PATIENT-LVL III: ICD-10-PCS | Mod: PBBFAC,,, | Performed by: INTERNAL MEDICINE

## 2022-05-27 PROCEDURE — 3075F SYST BP GE 130 - 139MM HG: CPT | Mod: CPTII,S$GLB,, | Performed by: INTERNAL MEDICINE

## 2022-05-27 PROCEDURE — 1159F PR MEDICATION LIST DOCUMENTED IN MEDICAL RECORD: ICD-10-PCS | Mod: CPTII,S$GLB,, | Performed by: INTERNAL MEDICINE

## 2022-05-27 PROCEDURE — 3079F PR MOST RECENT DIASTOLIC BLOOD PRESSURE 80-89 MM HG: ICD-10-PCS | Mod: CPTII,S$GLB,, | Performed by: INTERNAL MEDICINE

## 2022-05-27 RX ORDER — PANTOPRAZOLE SODIUM 40 MG/1
40 TABLET, DELAYED RELEASE ORAL EVERY MORNING
COMMUNITY
Start: 2022-03-31

## 2022-05-27 RX ORDER — IRBESARTAN 75 MG/1
75 TABLET ORAL DAILY
Qty: 30 TABLET | Refills: 11 | Status: SHIPPED | OUTPATIENT
Start: 2022-05-27 | End: 2022-09-30

## 2022-05-27 RX ORDER — FLUTICASONE PROPIONATE 50 MCG
1 SPRAY, SUSPENSION (ML) NASAL DAILY
COMMUNITY

## 2022-05-27 RX ORDER — FAMOTIDINE 20 MG/1
20 TABLET, FILM COATED ORAL
COMMUNITY
Start: 2022-04-25 | End: 2024-03-04

## 2022-05-27 NOTE — PROGRESS NOTES
Subjective:     Xuan Jasmine is a 81 y.o. female with hypertension, hypercholesterolemia and prediabetes. She is mildly obese. In 1994 when stationed in [place] she was diagnosed with hypothyroidism and having thyroid nodules. In 2022 she tells me she has been diagnosed with hyperparathyroidism. In 2019 she began to experinece exertional shortness of breath. On 12/28/2021 she had an echocardiogram that revealed normal left ventricular size and systolic function. The ejection fraction was 65%. The was left concentric remodeling and mild diastolic dysfunction. On 12/28/2021 she had a stress electrocardiogram and was able to do 5:15 minutes on the treadmill. She became short of breath but she had no chest pain. The electrocardiogram was negative. On 4/5/2022 she underwent parathyroidectomy at [facility]. It did not appear her exertional shortness of breath had a cardiac cause. She denies any chest pain, palpitations or weak spells. No issues with any of her prescribed medications. She is feeling well.      Shortness of Breath  This is a chronic problem. The current episode started more than 1 year ago. Pertinent negatives include no abdominal pain, chest pain, claudication, coryza, ear pain, fever, headaches, hemoptysis, leg pain, leg swelling, neck pain, orthopnea, PND, rash, rhinorrhea, sore throat, sputum production, swollen glands, syncope, vomiting or wheezing.   Hypertension  This is a chronic problem. The current episode started more than 1 year ago. The problem is unchanged. The problem is controlled (usually 120-130/70-80 mmHg at home). Associated symptoms include peripheral edema and shortness of breath. Pertinent negatives include no anxiety, blurred vision, chest pain, headaches, malaise/fatigue, neck pain, orthopnea, palpitations, PND or sweats. There is no history of chronic renal disease.   Hyperlipidemia  Exacerbating diseases include hypothyroidism and obesity. She has no history of chronic  renal disease, diabetes, liver disease or nephrotic syndrome. Associated symptoms include shortness of breath. Pertinent negatives include no chest pain, focal sensory loss, focal weakness, leg pain or myalgias.       Review of Systems   Constitutional: Negative for chills, fever and malaise/fatigue.   HENT: Negative for ear pain, nosebleeds, rhinorrhea, sore throat and tinnitus.    Eyes: Negative for blurred vision, double vision, vision loss in left eye and vision loss in right eye.   Cardiovascular: Positive for dyspnea on exertion. Negative for chest pain, claudication, irregular heartbeat, leg swelling, near-syncope, orthopnea, palpitations, paroxysmal nocturnal dyspnea and syncope.   Respiratory: Positive for shortness of breath. Negative for cough, hemoptysis, sputum production and wheezing.    Endocrine: Negative for cold intolerance and heat intolerance.   Hematologic/Lymphatic: Negative for bleeding problem. Does not bruise/bleed easily.   Skin: Negative for color change and rash.   Musculoskeletal: Negative for back pain, falls, muscle weakness, myalgias and neck pain.   Gastrointestinal: Negative for abdominal pain, diarrhea, dysphagia, heartburn, hematemesis, hematochezia, hemorrhoids, jaundice, melena, nausea and vomiting.   Genitourinary: Negative for dysuria and hematuria.   Neurological: Negative for dizziness, focal weakness, headaches, light-headedness, loss of balance, numbness, tremors, vertigo and weakness.   Psychiatric/Behavioral: Negative for altered mental status, depression and memory loss. The patient is not nervous/anxious.    Allergic/Immunologic: Negative for hives and persistent infections.       Current Outpatient Medications on File Prior to Visit   Medication Sig Dispense Refill    amLODIPine (NORVASC) 5 MG tablet Take 1 tablet (5 mg total) by mouth once daily. 90 tablet 3    co-enzyme Q-10 50 mg capsule Take 50 mg by mouth once daily.      ergocalciferol (ERGOCALCIFEROL) 50,000  "unit Cap Take 50,000 Units by mouth every 7 days.      famotidine (PEPCID) 20 MG tablet Take 20 mg by mouth. sometimes      FLAXSEED OIL ORAL Take by mouth.      fluticasone propionate (FLONASE) 50 mcg/actuation nasal spray 1 spray by Each Nostril route once daily.      pantoprazole (PROTONIX) 40 MG tablet Take 40 mg by mouth every morning. Sometimes      plecanatide (TRULANCE) 3 mg Tab Take by mouth.      polyethylene glycol (GLYCOLAX) 17 gram PwPk Take by mouth.      [DISCONTINUED] VASOTEC 2.5 mg tablet Take 1 tablet (2.5 mg total) by mouth 2 (two) times daily. 60 tablet 11     No current facility-administered medications on file prior to visit.       /80 (BP Location: Right arm, Patient Position: Sitting, BP Method: Medium (Manual))   Pulse 95   Ht 5' 2" (1.575 m)   Wt 82.3 kg (181 lb 8.8 oz)   SpO2 97%   BMI 33.21 kg/m²       Objective:     Physical Exam  Constitutional:       General: She is not in acute distress.     Appearance: Normal appearance. She is well-developed. She is not toxic-appearing or diaphoretic.   HENT:      Head: Normocephalic and atraumatic.      Nose: Nose normal.   Eyes:      General:         Right eye: No discharge.         Left eye: No discharge.      Conjunctiva/sclera:      Right eye: Right conjunctiva is not injected.      Left eye: Left conjunctiva is not injected.      Pupils: Pupils are equal.      Right eye: Pupil is round.      Left eye: Pupil is round.   Neck:      Thyroid: No thyromegaly.      Vascular: No carotid bruit or JVD.   Cardiovascular:      Rate and Rhythm: Normal rate and regular rhythm.  No extrasystoles are present.     Chest Wall: PMI is not displaced.      Pulses:           Radial pulses are 2+ on the right side and 2+ on the left side.        Femoral pulses are 2+ on the right side and 2+ on the left side.       Dorsalis pedis pulses are 2+ on the right side and 2+ on the left side.        Posterior tibial pulses are 2+ on the right side and 2+ " on the left side.      Heart sounds: S1 normal and S2 normal. Murmur heard.    Medium-pitched midsystolic murmur is present at the upper right sternal border.    Gallop present. S4 sounds present.   Pulmonary:      Effort: Pulmonary effort is normal.      Breath sounds: Normal breath sounds.   Abdominal:      Palpations: Abdomen is soft.      Tenderness: There is no abdominal tenderness.   Musculoskeletal:      Cervical back: Neck supple.      Right lower leg: Normal. No swelling. No edema.      Left lower leg: Normal. No swelling. No edema.   Lymphadenopathy:      Head:      Right side of head: No submandibular adenopathy.      Left side of head: No submandibular adenopathy.      Cervical: No cervical adenopathy.   Skin:     General: Skin is warm and dry.      Findings: No rash.      Nails: There is no clubbing.   Neurological:      General: No focal deficit present.      Mental Status: She is alert and oriented to person, place, and time. She is not disoriented.      Cranial Nerves: No cranial nerve deficit.   Psychiatric:         Attention and Perception: Attention and perception normal.         Mood and Affect: Mood and affect normal.         Speech: Speech normal.         Behavior: Behavior normal.         Thought Content: Thought content normal.         Cognition and Memory: Cognition and memory normal.         Judgment: Judgment normal.         Assessment:     1. Shortness of breath    2. Primary hypertension    3. Hypercholesterolemia    4. Prediabetes    5. Mild obesity    6. Multinodular goiter    7. Acquired hypothyroidism    8. Hyperparathyroidism         Plan:     1. Shortness of Breath   2020: Began to experience exertional shortness of breath.   2021: Echo: Normal left ventricular size and systolic function. EF 65%. LVCR. Mild diastolic dysfunction.    2021: Stress EC:15 min. SOB. No CP. ECG negative.   2021: CXR: Negative.   Does not appear her exertional SOB has a cardiac  cause.   Suspect main reason is her weight and recent weight gain.     2. Hypertension   1996: Diagnosed.    5/2022: Quinapril 40 mg Q24 was discontinued due to a metallic taste.   5/2022: Losartan 50 mg Q24 was discontinued due to SOB.   On amlodipine 5 mg Q24.   Keeping log at home.   5/27/2022: Irbesartan 75 mg Q24 to begin.    3. Hypercholesterolemia   2019: Diagnosed. Statin was begun.    Pravastatin was discontinued due to poor urination and chest pain.   Ezetimibe was discontinued due to chest pain.    4. Prediabetes    2019: Diagnosed.    Diet.    5. Mild Obesity   11/30/2021: Weight 81 kg. BMI 32.   Encouraged to lose weight.    6. Thyroid Nodules   1964: Diagnosed.    Dr. Martinez at [facility].    7. Hypothyroidism   1964: Diagnosed.    Dr. Martinez at [facility].    8. History of Hyperparathyroidism   12/30/2021: Ca 10.5. PTH 85.   4/5/2022: [facility]: Parathyroidectomy. Dr. Iraida Rooney.    9. Primary Care   Dr. Roseline Amos.    F/u 3 months.    Te Gilbetr M.D.

## 2022-06-22 ENCOUNTER — TELEPHONE (OUTPATIENT)
Dept: CARDIOLOGY | Facility: CLINIC | Age: 82
End: 2022-06-22
Payer: MEDICARE

## 2022-06-22 NOTE — TELEPHONE ENCOUNTER
Returned call to patient. No answer. Left a call back message.    ----- Message from Mimi Sotelo sent at 6/22/2022  3:47 PM CDT -----      Name of Who is Calling: MARYLOU CARRILLO [[id number]]      What is the request in detail: Pt called said she would like to speak with someone regarding the last medication she received.Please contact to further discuss and advise.          Can the clinic reply by [facility]: N      What Number to Call Back [facility] not [facility]: [phone]

## 2022-06-24 ENCOUNTER — TELEPHONE (OUTPATIENT)
Dept: CARDIOLOGY | Facility: CLINIC | Age: 82
End: 2022-06-24
Payer: MEDICARE

## 2022-06-24 NOTE — TELEPHONE ENCOUNTER
----- Message from Te Gilbert MD sent at 6/24/2022  3:39 PM CDT -----  She should keep log of her blood pressure.    Te Gilbert M.D.    ----- Message -----  From: Ilana Nagy  Sent: 6/24/2022  11:55 AM CDT  To: Te Gilbert MD    Stop taking irbesartan due to feeling off balance and light headed.      ----- Message -----  From: Cindy Mccall  Sent: 6/24/2022  11:13 AM CDT  To: Ofelia Tiwari Staff    Name of Who is Calling: MARYLOU CARRILLO         What is the request in detail:The patient is calling to follow up with the nurse in regards to medication. Please advise          Can the clinic reply by [facility]:\No         What Number to Call Back if not in [facility]: [phone]

## 2022-09-30 ENCOUNTER — OFFICE VISIT (OUTPATIENT)
Dept: CARDIOLOGY | Facility: CLINIC | Age: 82
End: 2022-09-30
Attending: INTERNAL MEDICINE
Payer: MEDICARE

## 2022-09-30 VITALS
SYSTOLIC BLOOD PRESSURE: 128 MMHG | OXYGEN SATURATION: 95 % | HEART RATE: 103 BPM | BODY MASS INDEX: 33.17 KG/M2 | DIASTOLIC BLOOD PRESSURE: 66 MMHG | WEIGHT: 180.25 LBS | HEIGHT: 62 IN

## 2022-09-30 DIAGNOSIS — E66.9 MILD OBESITY: ICD-10-CM

## 2022-09-30 DIAGNOSIS — E04.2 MULTINODULAR GOITER: ICD-10-CM

## 2022-09-30 DIAGNOSIS — E21.3 HYPERPARATHYROIDISM: ICD-10-CM

## 2022-09-30 DIAGNOSIS — I10 PRIMARY HYPERTENSION: ICD-10-CM

## 2022-09-30 DIAGNOSIS — R73.03 PREDIABETES: ICD-10-CM

## 2022-09-30 DIAGNOSIS — R06.02 SHORTNESS OF BREATH: ICD-10-CM

## 2022-09-30 DIAGNOSIS — E03.9 ACQUIRED HYPOTHYROIDISM: ICD-10-CM

## 2022-09-30 DIAGNOSIS — E78.00 HYPERCHOLESTEROLEMIA: ICD-10-CM

## 2022-09-30 PROCEDURE — 3078F DIAST BP <80 MM HG: CPT | Mod: CPTII,S$GLB,, | Performed by: INTERNAL MEDICINE

## 2022-09-30 PROCEDURE — 1101F PT FALLS ASSESS-DOCD LE1/YR: CPT | Mod: CPTII,S$GLB,, | Performed by: INTERNAL MEDICINE

## 2022-09-30 PROCEDURE — 99214 OFFICE O/P EST MOD 30 MIN: CPT | Mod: 25,S$GLB,, | Performed by: INTERNAL MEDICINE

## 2022-09-30 PROCEDURE — 3288F PR FALLS RISK ASSESSMENT DOCUMENTED: ICD-10-PCS | Mod: CPTII,S$GLB,, | Performed by: INTERNAL MEDICINE

## 2022-09-30 PROCEDURE — 1159F PR MEDICATION LIST DOCUMENTED IN MEDICAL RECORD: ICD-10-PCS | Mod: CPTII,S$GLB,, | Performed by: INTERNAL MEDICINE

## 2022-09-30 PROCEDURE — 99999 PR PBB SHADOW E&M-EST. PATIENT-LVL III: ICD-10-PCS | Mod: PBBFAC,,, | Performed by: INTERNAL MEDICINE

## 2022-09-30 PROCEDURE — 93005 ELECTROCARDIOGRAM TRACING: CPT

## 2022-09-30 PROCEDURE — 3074F PR MOST RECENT SYSTOLIC BLOOD PRESSURE < 130 MM HG: ICD-10-PCS | Mod: CPTII,S$GLB,, | Performed by: INTERNAL MEDICINE

## 2022-09-30 PROCEDURE — 1160F RVW MEDS BY RX/DR IN RCRD: CPT | Mod: CPTII,S$GLB,, | Performed by: INTERNAL MEDICINE

## 2022-09-30 PROCEDURE — 3078F PR MOST RECENT DIASTOLIC BLOOD PRESSURE < 80 MM HG: ICD-10-PCS | Mod: CPTII,S$GLB,, | Performed by: INTERNAL MEDICINE

## 2022-09-30 PROCEDURE — 99214 PR OFFICE/OUTPT VISIT, EST, LEVL IV, 30-39 MIN: ICD-10-PCS | Mod: 25,S$GLB,, | Performed by: INTERNAL MEDICINE

## 2022-09-30 PROCEDURE — 3288F FALL RISK ASSESSMENT DOCD: CPT | Mod: CPTII,S$GLB,, | Performed by: INTERNAL MEDICINE

## 2022-09-30 PROCEDURE — 99999 PR PBB SHADOW E&M-EST. PATIENT-LVL III: CPT | Mod: PBBFAC,,, | Performed by: INTERNAL MEDICINE

## 2022-09-30 PROCEDURE — 93000 ELECTROCARDIOGRAM COMPLETE: CPT | Mod: S$GLB,,, | Performed by: INTERNAL MEDICINE

## 2022-09-30 PROCEDURE — 93000 PR ELECTROCARDIOGRAM, COMPLETE: ICD-10-PCS | Mod: S$GLB,,, | Performed by: INTERNAL MEDICINE

## 2022-09-30 PROCEDURE — 1101F PR PT FALLS ASSESS DOC 0-1 FALLS W/OUT INJ PAST YR: ICD-10-PCS | Mod: CPTII,S$GLB,, | Performed by: INTERNAL MEDICINE

## 2022-09-30 PROCEDURE — 1160F PR REVIEW ALL MEDS BY PRESCRIBER/CLIN PHARMACIST DOCUMENTED: ICD-10-PCS | Mod: CPTII,S$GLB,, | Performed by: INTERNAL MEDICINE

## 2022-09-30 PROCEDURE — 3074F SYST BP LT 130 MM HG: CPT | Mod: CPTII,S$GLB,, | Performed by: INTERNAL MEDICINE

## 2022-09-30 PROCEDURE — 1125F AMNT PAIN NOTED PAIN PRSNT: CPT | Mod: CPTII,S$GLB,, | Performed by: INTERNAL MEDICINE

## 2022-09-30 PROCEDURE — 1159F MED LIST DOCD IN RCRD: CPT | Mod: CPTII,S$GLB,, | Performed by: INTERNAL MEDICINE

## 2022-09-30 PROCEDURE — 1125F PR PAIN SEVERITY QUANTIFIED, PAIN PRESENT: ICD-10-PCS | Mod: CPTII,S$GLB,, | Performed by: INTERNAL MEDICINE

## 2022-09-30 NOTE — PROGRESS NOTES
Subjective:     Xuan Jasmine is a 82 y.o. female with hypertension, hypercholesterolemia and prediabetes. She is mildly obese. In 1994 when stationed in [place] she was diagnosed with hypothyroidism and having thyroid nodules. In 2022 she told me she had been diagnosed with hyperparathyroidism. In 2019 she began to experinece exertional shortness of breath. On 12/28/2021 she had an echocardiogram that revealed normal left ventricular size and systolic function. The ejection fraction was 65%. The was left concentric remodeling and mild diastolic dysfunction. On 12/28/2021 she had a stress electrocardiogram and was able to do 5:15 minutes on the treadmill. She became short of breath but she had no chest pain. The electrocardiogram was negative. On 4/5/2022 she underwent parathyroidectomy at [facility]. It did not appear her exertional shortness of breath had a cardiac cause. She denies any chest pain, palpitations or weak spells. No issues with any of her prescribed medications. She is feeling well.      Shortness of Breath  This is a chronic problem. The current episode started more than 1 year ago. Pertinent negatives include no abdominal pain, chest pain, claudication, coryza, ear pain, fever, headaches, hemoptysis, leg pain, leg swelling, neck pain, orthopnea, PND, rash, rhinorrhea, sore throat, sputum production, swollen glands, syncope, vomiting or wheezing.   Hypertension  This is a chronic problem. The current episode started more than 1 year ago. The problem is unchanged. The problem is controlled (usually 120-130/70-80 mmHg at home). Associated symptoms include peripheral edema and shortness of breath. Pertinent negatives include no anxiety, blurred vision, chest pain, headaches, malaise/fatigue, neck pain, orthopnea, palpitations, PND or sweats. There is no history of chronic renal disease.   Hyperlipidemia  Exacerbating diseases include hypothyroidism and obesity. She has no  history of chronic renal disease, diabetes, liver disease or nephrotic syndrome. Associated symptoms include shortness of breath. Pertinent negatives include no chest pain, focal sensory loss, focal weakness, leg pain or myalgias.     Review of Systems   Constitutional: Negative for chills, fever and malaise/fatigue.   HENT:  Negative for ear pain, nosebleeds, rhinorrhea, sore throat and tinnitus.    Eyes:  Negative for blurred vision, double vision, vision loss in left eye and vision loss in right eye.   Cardiovascular:  Positive for dyspnea on exertion. Negative for chest pain, claudication, irregular heartbeat, leg swelling, near-syncope, orthopnea, palpitations, paroxysmal nocturnal dyspnea and syncope.   Respiratory:  Positive for shortness of breath. Negative for cough, hemoptysis, sputum production and wheezing.    Endocrine: Negative for cold intolerance and heat intolerance.   Hematologic/Lymphatic: Negative for bleeding problem. Does not bruise/bleed easily.   Skin:  Negative for color change and rash.   Musculoskeletal:  Negative for back pain, falls, muscle weakness, myalgias and neck pain.   Gastrointestinal:  Negative for abdominal pain, diarrhea, dysphagia, heartburn, hematemesis, hematochezia, hemorrhoids, jaundice, melena, nausea and vomiting.   Genitourinary:  Negative for dysuria and hematuria.   Neurological:  Negative for dizziness, focal weakness, headaches, light-headedness, loss of balance, numbness, tremors, vertigo and weakness.   Psychiatric/Behavioral:  Negative for altered mental status, depression and memory loss. The patient is not nervous/anxious.    Allergic/Immunologic: Negative for hives and persistent infections.       Current Outpatient Medications on File Prior to Visit   Medication Sig Dispense Refill    amLODIPine (NORVASC) 5 MG tablet Take 1 tablet (5 mg total) by mouth once daily. 90 tablet 3    ergocalciferol (ERGOCALCIFEROL) 50,000 unit Cap Take 50,000 Units by mouth every  "7 days.      FLAXSEED OIL ORAL Take by mouth.      plecanatide (TRULANCE) 3 mg Tab Take by mouth.      polyethylene glycol (GLYCOLAX) 17 gram PwPk Take by mouth.      co-enzyme Q-10 50 mg capsule Take 50 mg by mouth once daily.      famotidine (PEPCID) 20 MG tablet Take 20 mg by mouth. sometimes      fluticasone propionate (FLONASE) 50 mcg/actuation nasal spray 1 spray by Each Nostril route once daily.      irbesartan (AVAPRO) 75 MG tablet Take 1 tablet (75 mg total) by mouth once daily. (Patient not taking: Reported on 9/30/2022) 30 tablet 11    pantoprazole (PROTONIX) 40 MG tablet Take 40 mg by mouth every morning. Sometimes       No current facility-administered medications on file prior to visit.       /66 (BP Location: Right arm, Patient Position: Sitting, BP Method: Medium (Manual))   Pulse 103   Ht 5' 2" (1.575 m)   Wt 81.8 kg (180 lb 3.6 oz)   SpO2 95%   BMI 32.96 kg/m²       Objective:     Physical Exam  Constitutional:       General: She is not in acute distress.     Appearance: Normal appearance. She is well-developed. She is not toxic-appearing or diaphoretic.   HENT:      Head: Normocephalic and atraumatic.      Nose: Nose normal.   Eyes:      General:         Right eye: No discharge.         Left eye: No discharge.      Conjunctiva/sclera:      Right eye: Right conjunctiva is not injected.      Left eye: Left conjunctiva is not injected.      Pupils: Pupils are equal.      Right eye: Pupil is round.      Left eye: Pupil is round.   Neck:      Thyroid: No thyromegaly.      Vascular: No carotid bruit or JVD.   Cardiovascular:      Rate and Rhythm: Normal rate and regular rhythm. No extrasystoles are present.     Chest Wall: PMI is not displaced.      Pulses:           Radial pulses are 2+ on the right side and 2+ on the left side.        Femoral pulses are 2+ on the right side and 2+ on the left side.       Dorsalis pedis pulses are 2+ on the right side and 2+ on the left side.        " Posterior tibial pulses are 2+ on the right side and 2+ on the left side.      Heart sounds: S1 normal and S2 normal. Murmur heard.   Medium-pitched midsystolic murmur is present at the upper right sternal border.     Gallop present. S4 sounds present.   Pulmonary:      Effort: Pulmonary effort is normal.      Breath sounds: Normal breath sounds.   Abdominal:      Palpations: Abdomen is soft.      Tenderness: There is no abdominal tenderness.   Musculoskeletal:      Cervical back: Neck supple.      Right lower leg: Normal. No swelling. No edema.      Left lower leg: Normal. No swelling. No edema.   Lymphadenopathy:      Head:      Right side of head: No submandibular adenopathy.      Left side of head: No submandibular adenopathy.      Cervical: No cervical adenopathy.   Skin:     General: Skin is warm and dry.      Findings: No rash.      Nails: There is no clubbing.   Neurological:      General: No focal deficit present.      Mental Status: She is alert and oriented to person, place, and time. She is not disoriented.      Cranial Nerves: No cranial nerve deficit.   Psychiatric:         Attention and Perception: Attention and perception normal.         Mood and Affect: Mood and affect normal.         Speech: Speech normal.         Behavior: Behavior normal.         Thought Content: Thought content normal.         Cognition and Memory: Cognition and memory normal.         Judgment: Judgment normal.       Assessment:     1. Shortness of breath    2. Primary hypertension    3. Hypercholesterolemia    4. Prediabetes    5. Mild obesity    6. Multinodular goiter    7. Acquired hypothyroidism    8. Hyperparathyroidism         Plan:     1. Shortness of Breath   2020: Began to experience exertional shortness of breath.   2021: Echo: Normal left ventricular size and systolic function. EF 65%. LVCR. Mild diastolic dysfunction.    2021: Stress EC:15 min. SOB. No CP. ECG negative.   2021: CXR:  "Negative.   Does not appear her exertional SOB has a cardiac cause.   Suspect main reason is her weight and recent weight gain.     2. Hypertension   1996: Diagnosed.    5/2022: Quinapril 40 mg Q24 was discontinued due to a metallic taste.   5/2022: Losartan 50 mg Q24 was discontinued due to SOB.   5/27/2022: Irbesartan 75 mg Q24 was begun. But she later discontinued it due to "side effect".   On amlodipine 5 mg Q24.   Keeping log at home.   Well controlled.    3. Hypercholesterolemia   2019: Diagnosed. Statin was begun.  8/30/2022: Chol 220. HDL 68. . .    Pravastatin was discontinued due to poor urination and chest pain.   Ezetimibe was discontinued due to chest pain.    4. Prediabetes    2019: Diagnosed.    8/22/2022: HgbA1C 6.1%.   Diet.    5. Mild Obesity   11/30/2021: Weight 81 kg. BMI 32.   Encouraged to lose weight.    6. Thyroid Nodules   1964: Diagnosed.    Dr. Martinez at [facility].    7. Hypothyroidism   1964: Diagnosed.    Dr. Martinez at [facility].    8. History of Hyperparathyroidism   12/30/2021: Ca 10.5. PTH 85.   4/5/2022: [facility]: Parathyroidectomy. Dr. Iraida Rooney.    9. Primary Care   Dr. Roseline Amos.    F/u 6 months.    Te Gilbert M.D.                "

## 2023-03-31 ENCOUNTER — OFFICE VISIT (OUTPATIENT)
Dept: CARDIOLOGY | Facility: CLINIC | Age: 83
End: 2023-03-31
Attending: INTERNAL MEDICINE
Payer: MEDICARE

## 2023-03-31 VITALS
HEIGHT: 62 IN | WEIGHT: 185.94 LBS | HEART RATE: 84 BPM | BODY MASS INDEX: 34.22 KG/M2 | SYSTOLIC BLOOD PRESSURE: 128 MMHG | DIASTOLIC BLOOD PRESSURE: 68 MMHG | OXYGEN SATURATION: 96 %

## 2023-03-31 DIAGNOSIS — R73.03 PREDIABETES: ICD-10-CM

## 2023-03-31 DIAGNOSIS — E03.9 ACQUIRED HYPOTHYROIDISM: ICD-10-CM

## 2023-03-31 DIAGNOSIS — I10 PRIMARY HYPERTENSION: ICD-10-CM

## 2023-03-31 DIAGNOSIS — E78.00 HYPERCHOLESTEROLEMIA: ICD-10-CM

## 2023-03-31 DIAGNOSIS — E04.2 MULTINODULAR GOITER: ICD-10-CM

## 2023-03-31 DIAGNOSIS — R06.02 SHORTNESS OF BREATH: ICD-10-CM

## 2023-03-31 DIAGNOSIS — E66.9 MILD OBESITY: ICD-10-CM

## 2023-03-31 PROCEDURE — 1159F MED LIST DOCD IN RCRD: CPT | Mod: CPTII,S$GLB,, | Performed by: INTERNAL MEDICINE

## 2023-03-31 PROCEDURE — 1160F RVW MEDS BY RX/DR IN RCRD: CPT | Mod: CPTII,S$GLB,, | Performed by: INTERNAL MEDICINE

## 2023-03-31 PROCEDURE — 3074F SYST BP LT 130 MM HG: CPT | Mod: CPTII,S$GLB,, | Performed by: INTERNAL MEDICINE

## 2023-03-31 PROCEDURE — 3078F DIAST BP <80 MM HG: CPT | Mod: CPTII,S$GLB,, | Performed by: INTERNAL MEDICINE

## 2023-03-31 PROCEDURE — 3078F PR MOST RECENT DIASTOLIC BLOOD PRESSURE < 80 MM HG: ICD-10-PCS | Mod: CPTII,S$GLB,, | Performed by: INTERNAL MEDICINE

## 2023-03-31 PROCEDURE — 1160F PR REVIEW ALL MEDS BY PRESCRIBER/CLIN PHARMACIST DOCUMENTED: ICD-10-PCS | Mod: CPTII,S$GLB,, | Performed by: INTERNAL MEDICINE

## 2023-03-31 PROCEDURE — 3074F PR MOST RECENT SYSTOLIC BLOOD PRESSURE < 130 MM HG: ICD-10-PCS | Mod: CPTII,S$GLB,, | Performed by: INTERNAL MEDICINE

## 2023-03-31 PROCEDURE — 99214 PR OFFICE/OUTPT VISIT, EST, LEVL IV, 30-39 MIN: ICD-10-PCS | Mod: S$GLB,,, | Performed by: INTERNAL MEDICINE

## 2023-03-31 PROCEDURE — 1125F PR PAIN SEVERITY QUANTIFIED, PAIN PRESENT: ICD-10-PCS | Mod: CPTII,S$GLB,, | Performed by: INTERNAL MEDICINE

## 2023-03-31 PROCEDURE — 1101F PR PT FALLS ASSESS DOC 0-1 FALLS W/OUT INJ PAST YR: ICD-10-PCS | Mod: CPTII,S$GLB,, | Performed by: INTERNAL MEDICINE

## 2023-03-31 PROCEDURE — 99214 OFFICE O/P EST MOD 30 MIN: CPT | Mod: S$GLB,,, | Performed by: INTERNAL MEDICINE

## 2023-03-31 PROCEDURE — 1101F PT FALLS ASSESS-DOCD LE1/YR: CPT | Mod: CPTII,S$GLB,, | Performed by: INTERNAL MEDICINE

## 2023-03-31 PROCEDURE — 99999 PR PBB SHADOW E&M-EST. PATIENT-LVL III: CPT | Mod: PBBFAC,,, | Performed by: INTERNAL MEDICINE

## 2023-03-31 PROCEDURE — 1125F AMNT PAIN NOTED PAIN PRSNT: CPT | Mod: CPTII,S$GLB,, | Performed by: INTERNAL MEDICINE

## 2023-03-31 PROCEDURE — 1159F PR MEDICATION LIST DOCUMENTED IN MEDICAL RECORD: ICD-10-PCS | Mod: CPTII,S$GLB,, | Performed by: INTERNAL MEDICINE

## 2023-03-31 PROCEDURE — 3288F FALL RISK ASSESSMENT DOCD: CPT | Mod: CPTII,S$GLB,, | Performed by: INTERNAL MEDICINE

## 2023-03-31 PROCEDURE — 99999 PR PBB SHADOW E&M-EST. PATIENT-LVL III: ICD-10-PCS | Mod: PBBFAC,,, | Performed by: INTERNAL MEDICINE

## 2023-03-31 PROCEDURE — 3288F PR FALLS RISK ASSESSMENT DOCUMENTED: ICD-10-PCS | Mod: CPTII,S$GLB,, | Performed by: INTERNAL MEDICINE

## 2023-03-31 RX ORDER — AMLODIPINE BESYLATE 5 MG/1
5 TABLET ORAL DAILY
Qty: 90 TABLET | Refills: 3 | Status: SHIPPED | OUTPATIENT
Start: 2023-03-31 | End: 2023-09-12

## 2023-03-31 NOTE — PROGRESS NOTES
Subjective:     Xuan Jasmine is a 82 y.o. female with hypertension, hypercholesterolemia and prediabetes. She is mildly obese. In 1994 when stationed in [place] she was diagnosed with hypothyroidism and having thyroid nodules. In 2022 she told me she had been diagnosed with hyperparathyroidism. In 2019 she began to experinece exertional shortness of breath. On 12/28/2021 she had an echocardiogram that revealed normal left ventricular size and systolic function. The ejection fraction was 65%. The was left concentric remodeling and mild diastolic dysfunction. On 12/28/2021 she had a stress electrocardiogram and was able to do 5:15 minutes on the treadmill. She became short of breath but she had no chest pain. The electrocardiogram was negative. On 4/5/2022 she underwent parathyroidectomy at [facility]. It did not appear her exertional shortness of breath had a cardiac cause. She denies any chest pain, palpitations or weak spells. No issues with any of her prescribed medications. She is feeling well.      Shortness of Breath  This is a chronic problem. The current episode started more than 1 year ago. Pertinent negatives include no abdominal pain, chest pain, claudication, coryza, ear pain, fever, headaches, hemoptysis, leg pain, leg swelling, neck pain, orthopnea, PND, rash, rhinorrhea, sore throat, sputum production, swollen glands, syncope, vomiting or wheezing.   Hypertension  This is a chronic problem. The current episode started more than 1 year ago. The problem is unchanged. The problem is controlled (usually 120-130/70-80 mmHg at home). Associated symptoms include peripheral edema and shortness of breath. Pertinent negatives include no anxiety, blurred vision, chest pain, headaches, malaise/fatigue, neck pain, orthopnea, palpitations, PND or sweats. There is no history of chronic renal disease.   Hyperlipidemia  Exacerbating diseases include hypothyroidism and obesity. She has no history  of chronic renal disease, diabetes, liver disease or nephrotic syndrome. Associated symptoms include shortness of breath. Pertinent negatives include no chest pain, focal sensory loss, focal weakness, leg pain or myalgias.     Review of Systems   Constitutional: Negative for chills, fever and malaise/fatigue.   HENT:  Negative for ear pain, nosebleeds, rhinorrhea, sore throat and tinnitus.    Eyes:  Negative for blurred vision, double vision, vision loss in left eye and vision loss in right eye.   Cardiovascular:  Positive for dyspnea on exertion. Negative for chest pain, claudication, irregular heartbeat, leg swelling, near-syncope, orthopnea, palpitations, paroxysmal nocturnal dyspnea and syncope.   Respiratory:  Positive for shortness of breath. Negative for cough, hemoptysis, sputum production and wheezing.    Endocrine: Negative for cold intolerance and heat intolerance.   Hematologic/Lymphatic: Negative for bleeding problem. Does not bruise/bleed easily.   Skin:  Negative for color change and rash.   Musculoskeletal:  Negative for back pain, falls, muscle weakness, myalgias and neck pain.   Gastrointestinal:  Negative for abdominal pain, diarrhea, dysphagia, heartburn, hematemesis, hematochezia, hemorrhoids, jaundice, melena, nausea and vomiting.   Genitourinary:  Negative for dysuria and hematuria.   Neurological:  Negative for dizziness, focal weakness, headaches, light-headedness, loss of balance, numbness, tremors, vertigo and weakness.   Psychiatric/Behavioral:  Negative for altered mental status, depression and memory loss. The patient is not nervous/anxious.    Allergic/Immunologic: Negative for hives and persistent infections.       Current Outpatient Medications on File Prior to Visit   Medication Sig Dispense Refill    amLODIPine (NORVASC) 5 MG tablet Take 1 tablet (5 mg total) by mouth once daily. 90 tablet 3    co-enzyme Q-10 50 mg capsule Take 50 mg by mouth once daily.      ergocalciferol  "(ERGOCALCIFEROL) 50,000 unit Cap Take 50,000 Units by mouth every 7 days.      FLAXSEED OIL ORAL Take by mouth.      fluticasone propionate (FLONASE) 50 mcg/actuation nasal spray 1 spray by Each Nostril route once daily.      plecanatide (TRULANCE) 3 mg Tab Take by mouth.      polyethylene glycol (GLYCOLAX) 17 gram PwPk Take by mouth.      famotidine (PEPCID) 20 MG tablet Take 20 mg by mouth. sometimes      pantoprazole (PROTONIX) 40 MG tablet Take 40 mg by mouth every morning. Sometimes       No current facility-administered medications on file prior to visit.       /68 (BP Location: Left arm, Patient Position: Sitting, BP Method: Medium (Manual))   Pulse 84   Ht 5' 2" (1.575 m)   Wt 84.4 kg (185 lb 15.3 oz)   SpO2 96%   BMI 34.01 kg/m²       Objective:     Physical Exam  Constitutional:       General: She is not in acute distress.     Appearance: Normal appearance. She is well-developed. She is not toxic-appearing or diaphoretic.   HENT:      Head: Normocephalic and atraumatic.      Nose: Nose normal.   Eyes:      General:         Right eye: No discharge.         Left eye: No discharge.      Conjunctiva/sclera:      Right eye: Right conjunctiva is not injected.      Left eye: Left conjunctiva is not injected.      Pupils: Pupils are equal.      Right eye: Pupil is round.      Left eye: Pupil is round.   Neck:      Thyroid: No thyromegaly.      Vascular: No carotid bruit or JVD.   Cardiovascular:      Rate and Rhythm: Normal rate and regular rhythm. No extrasystoles are present.     Chest Wall: PMI is not displaced.      Pulses:           Radial pulses are 2+ on the right side and 2+ on the left side.        Femoral pulses are 2+ on the right side and 2+ on the left side.       Dorsalis pedis pulses are 2+ on the right side and 2+ on the left side.        Posterior tibial pulses are 2+ on the right side and 2+ on the left side.      Heart sounds: S1 normal and S2 normal. Murmur heard.   Medium-pitched " midsystolic murmur is present at the upper right sternal border.     Gallop present. S4 sounds present.   Pulmonary:      Effort: Pulmonary effort is normal.      Breath sounds: Normal breath sounds.   Abdominal:      Palpations: Abdomen is soft.      Tenderness: There is no abdominal tenderness.   Musculoskeletal:      Cervical back: Neck supple.      Right lower leg: Normal. No swelling. No edema.      Left lower leg: Normal. No swelling. No edema.   Lymphadenopathy:      Head:      Right side of head: No submandibular adenopathy.      Left side of head: No submandibular adenopathy.      Cervical: No cervical adenopathy.   Skin:     General: Skin is warm and dry.      Findings: No rash.      Nails: There is no clubbing.   Neurological:      General: No focal deficit present.      Mental Status: She is alert and oriented to person, place, and time. She is not disoriented.      Cranial Nerves: No cranial nerve deficit.   Psychiatric:         Attention and Perception: Attention and perception normal.         Mood and Affect: Mood and affect normal.         Speech: Speech normal.         Behavior: Behavior normal.         Thought Content: Thought content normal.         Cognition and Memory: Cognition and memory normal.         Judgment: Judgment normal.       Assessment:     1. Shortness of breath    2. Primary hypertension    3. Hypercholesterolemia    4. Prediabetes    5. Mild obesity    6. Multinodular goiter    7. Acquired hypothyroidism         Plan:     1. Shortness of Breath   2020: Began to experience exertional shortness of breath.   2021: Echo: Normal left ventricular size and systolic function. EF 65%. LVCR. Mild diastolic dysfunction.    2021: Stress EC:15 min. SOB. No CP. ECG negative.   2021: CXR: Negative.   Does not appear her exertional SOB has a cardiac cause.   Suspect main reason is her weight.   Discussed diet and exercise.      2. Hypertension   : Diagnosed.    2022:  "Quinapril 40 mg Q24 was discontinued due to a metallic taste.   5/2022: Losartan 50 mg Q24 was discontinued due to SOB.   5/27/2022: Irbesartan 75 mg Q24 was begun. But she later discontinued it due to "side effect".   On amlodipine 5 mg Q24.   Keeping log at home.   Well controlled.    3. Hypercholesterolemia   2019: Diagnosed. Statin was begun.  8/30/2022: Chol 220. HDL 68. . .    Pravastatin was discontinued due to poor urination and chest pain.   Ezetimibe was discontinued due to chest pain.    4. Prediabetes    2019: Diagnosed.    8/22/2022: HgbA1C 6.1%.   Diet.    5. Mild Obesity   11/30/2021: Weight 81 kg. BMI 32.   Encouraged to lose weight.    6. Thyroid Nodules   1964: Diagnosed.    Dr. Martinez at [facility].    7. Hypothyroidism   1964: Diagnosed.    Dr. Martinez at [facility].    8. History of Hyperparathyroidism   12/30/2021: Ca 10.5. PTH 85.   4/5/2022: [facility]: Parathyroidectomy. Dr. Iraida Rooney.    9. Primary Care   Dr. Roseline Amos.    F/u 6 months.    Te Gilbert M.D.                "

## 2023-09-12 DIAGNOSIS — I10 PRIMARY HYPERTENSION: ICD-10-CM

## 2023-09-12 RX ORDER — AMLODIPINE BESYLATE 5 MG/1
5 TABLET ORAL
Qty: 90 TABLET | Refills: 3 | Status: SHIPPED | OUTPATIENT
Start: 2023-09-12

## 2024-03-04 ENCOUNTER — OFFICE VISIT (OUTPATIENT)
Dept: OBSTETRICS AND GYNECOLOGY | Facility: CLINIC | Age: 84
End: 2024-03-04
Payer: MEDICARE

## 2024-03-04 VITALS
HEIGHT: 62 IN | DIASTOLIC BLOOD PRESSURE: 86 MMHG | WEIGHT: 184.63 LBS | BODY MASS INDEX: 33.98 KG/M2 | SYSTOLIC BLOOD PRESSURE: 120 MMHG

## 2024-03-04 DIAGNOSIS — Z01.419 ENCOUNTER FOR WELL WOMAN EXAM: Primary | ICD-10-CM

## 2024-03-04 DIAGNOSIS — Z13.820 ENCOUNTER FOR SCREENING FOR OSTEOPOROSIS: ICD-10-CM

## 2024-03-04 PROCEDURE — 99387 INIT PM E/M NEW PAT 65+ YRS: CPT | Mod: S$GLB,,, | Performed by: STUDENT IN AN ORGANIZED HEALTH CARE EDUCATION/TRAINING PROGRAM

## 2024-03-04 PROCEDURE — 99999 PR PBB SHADOW E&M-EST. PATIENT-LVL III: CPT | Mod: PBBFAC,,, | Performed by: STUDENT IN AN ORGANIZED HEALTH CARE EDUCATION/TRAINING PROGRAM

## 2024-03-04 NOTE — PROGRESS NOTES
"OBSTETRICS AND GYNECOLOGY      Chief Complaint:  Well Woman Exam, Establish Care     HPI:      Xuan Jasmine is a 83 y.o. No obstetric history on file. who presents today for well woman exam.    LMP: No LMP recorded. Patient has had a hysterectomy. Patient denies abnormal vaginal bleeding, abnormal discharge/odor, pelvic pain, or dysuria/hematuria.  Ms. Jasmine is currently sexually active with a single male partner.  She declines STD screening today.  She denies additional acute issues, problems, or complaints.    Ms. Jasmine confirms that she wears her seatbelt when riding in the car.     GYNHx:  Menarche 12  Menopause unsure  ISSA:  1968, fibroids  CS:  1963, thinks around 37wks, male  History of STDs: no  History of abnormal pap smears: never  States was not getting pap smears with prior OB/GYN.  Sexually active: yes, male partner, endorses safety  # of lifetime partners: 3  Breast/GYN/colon malignancy family history:  no    ROS:     GENERAL: Feeling well overall.   BREASTS: Denies breast skin changes, lumps or nipple discharge.   URINARY: Denies dysuria, hematuria.    Physical Exam:      PHYSICAL EXAM:  /86 (BP Location: Left arm, Patient Position: Sitting, BP Method: Medium (Manual))   Ht 5' 2" (1.575 m)   Wt 83.7 kg (184 lb 10.2 oz)   BMI 33.77 kg/m²   Body mass index is 33.77 kg/m².     APPEARANCE:  Well nourished, well developed, in no acute distress.  Able to smile appropriately during our encounter. Makes eye contact. Pleasant.  PSYCH: Appropriate mood and affect.  SKIN:   No acne or hirsutism.  CARDIOVASCULAR:  No edema of peripheral extremities. Well perfused throughout.  RESP:  No accessory muscle use to breathe. Speaking comfortably in complete sentences.   BREASTS:  Symmetrical, with no visible skin lesions or scars, no palpable masses. No nipple discharge or peau d'orange bilaterally. No palpable axillary LAD.  ABDOMEN:  Soft. Nonacute.  PELVIC:  Normal external genitalia without " lesions. Normal hair distribution. Adequate perineal body, normal urethral meatus. Vagina moist and rugated. Minimal atrophy. Without lesions, without discharge.  Adnexa without masses or tenderness.      Female chaperone present.    Assessment/Plan:     -- Counseled patient regarding healthy diet and regular exercise, daily seat belt use.   -- BP normotensive  -- She denies abuse and feels safe at home.  -- HRT:  no  -- STD screening:  declines   -- MMG:  BiRADS Cat 0 for L breast (1/2024) > BiRADs Cat 2 MMG, US with rec to return to annual screening (2/2024)  -- Colonoscopy:  thinks about 5 years ago, follows with Dr. Coelho, GI; unsure of next cscope rec  -- [facility]   -- Labs, reviewed today:  Vit D WNL 8/2023  -- Record request, Dr. ONELIA De Souza, prior OB/GYN retired    Follow up in about 1 year (around 3/4/2025) for WWE.    Counseling:     Patient was counseled today on current ASCCP pap guidelines, the recommendation for yearly physical exams, safe driving habits, breast self awareness and annual mammograms. She is to see her PCP for other health maintenance.     Use of the [facility] Patient Portal discussed and encouraged during today's visit.                 As of April 1, 2021, the Cures Act has been passed nationally. This new law requires that all doctors progress notes, lab results, pathology reports and radiology reports be released IMMEDIATELY to the patient in the patient portal. That means that the results are released to you at the EXACT same time they are released to me. Therefore, with all of the patients that I have I am not able to reply to each patient exactly when the results come in. So there will be a delay from when you see the results to when I see them and have time to come up with a response to send you. Also I only see these results when I am on the computer at work. So if the results come in over the weekend or after 5 pm of a work day, I will not  see them until the next business day. As you can tell, this is a challenge as a physician to give every patient the quick response they hope for and deserve. So please be patient!   Thanks for your understanding and patience.

## 2024-03-07 ENCOUNTER — TELEPHONE (OUTPATIENT)
Dept: OBSTETRICS AND GYNECOLOGY | Facility: CLINIC | Age: 84
End: 2024-03-07
Payer: MEDICARE

## 2024-03-07 DIAGNOSIS — Z78.0 MENOPAUSE: Primary | ICD-10-CM

## 2024-08-01 ENCOUNTER — NURSE TRIAGE (OUTPATIENT)
Dept: ADMINISTRATIVE | Facility: CLINIC | Age: 84
End: 2024-08-01
Payer: MEDICARE

## 2024-08-01 NOTE — TELEPHONE ENCOUNTER
Pt states that she had numbness to left side of her face and seen in ED 2 weeks ago and had a MRI to rule out stroke. Now c/o swelling to left side of face. Pt states that swelling started 7/2/24 after RFA procedure by Js Silvestre with [facility]. Pt also c/o some chest tightness. Pt advised to go to ED/UC now per protocol. VU. Secure chat sent to KRITSEN Morrissey, agreed that pt be seen in ED/UC.  Reason for Disposition   Patient sounds very sick or weak to the triager    Additional Information   Negative: Life-threatening reaction (anaphylaxis) in the past to similar substance (e.g., food, insect bite/sting, chemical, etc.) and < 2 hours since exposure   Negative: Unresponsive, passed out or very weak   Negative: Swollen tongue   Negative: Difficulty breathing or wheezing   Negative: Sounds like a life-threatening emergency to the triager   Negative: SEVERE swelling of entire face and < 2 hours since exposure to high-risk allergen (e.g., peanuts, tree nuts, fish, shellfish or 1st dose of drug) and no serious symptoms AND [4] no serious allergic reaction in the past   Negative: Pregnant > 20 weeks   Negative: Postpartum (from 0 to 6 weeks after delivery)   Negative: Fever   Negative: Taking an ACE Inhibitor medicine (e.g., benazepril/LOTENSIN, captopril/CAPOTEN, enalapril/VASOTEC, lisinopril/ZESTRIL)    Protocols used: Face Swelling-A-OH

## 2024-09-06 DIAGNOSIS — I10 PRIMARY HYPERTENSION: ICD-10-CM

## 2024-09-06 RX ORDER — AMLODIPINE BESYLATE 5 MG/1
5 TABLET ORAL
Qty: 90 TABLET | Refills: 3 | OUTPATIENT
Start: 2024-09-06

## 2025-05-21 ENCOUNTER — TELEPHONE (OUTPATIENT)
Dept: SURGERY | Facility: CLINIC | Age: 85
End: 2025-05-21
Payer: MEDICARE

## 2025-05-21 NOTE — TELEPHONE ENCOUNTER
----- Message from Inoc[place] sent at 5/21/2025  3:32 PM CDT -----  Type:  Sooner Appointment RequestCaller is requesting a sooner appointment.  Name of Caller:Ms Jasmine When is the first available appointment?none Symptoms:Thyroid issues Would the patient rather a call back or a response via [facility]? Call Best Call Back Number: [phone] Information: please call want to schedule appt to see her at the [facility] location

## 2025-05-23 DIAGNOSIS — E04.1 THYROID NODULE: Primary | ICD-10-CM

## 2025-05-26 ENCOUNTER — TELEPHONE (OUTPATIENT)
Dept: SURGERY | Facility: CLINIC | Age: 85
End: 2025-05-26
Payer: MEDICARE

## 2025-06-13 NOTE — PROGRESS NOTES
Endocrine Surgery History & Physical     REFERRING PROVIDER: Katy Coffey    ENDOCRINOLOGIST: [facility] with Dr. Ziegler    REASON FOR VISIT: Multiple thyroid nodules, prior radiofrequency ablation    HPI: Xuan Jasmine is a 84 y.o. female patient with a history notable for a right thyroid nodule who is status post radiofrequency ablation of right thyroid nodule and core needle biopsy of the right thyroid nodule on 7/2/2024 at [facility]. 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    Additional details from outside records:   She is s/p RFA & CNB isthmus nodule on 12/8/2022 that measured: 1.86 x 1.74 x 0.77 cm on 10/14/2022 that decreased in size to measured:0.88 x 0.87 x 0.35 cm on 8/7/2023 neck u/s with 83% VRR. She is s/p RFA and core needle biopsy on 12/21/2021 of the right thyroid nodule measured 3.05 x 1.49 x 1.46cm on 11/8/2021 neck U/S that decreased in size to measure 0.9 x 0.84 x 0.83 cm on 8/7/2023 neck U/S with 91 % VRR (FNA: Non diagnostic, FNA 11/8/2021: Benign, CNB on 12/21/2021: Benign).    She is seen today for a second opinion as a repeat RFA or nanosecond pulse therapy ablation is planned for her, she has had concerns about new onset ptosis of the right eye and new onset hoarseness after prior RFA, and is inquiring about other options.     Details of the workup per chart review are as follows:     Recent laboratory studies:  TSH: 0.76  Free T4: 1.0  T3: 83    Thyroid and Neck Imaging:  Most recent thyroid ultrasound: 5/15/25  Dominant thyroid nodules:   Right; 1.43 x 0.72 x 0.51 cm TR4  Interval changes from prior ultrasound: cannot be determined - limited images available on disks brought in by  patient    Cytology:  Multiple FNA and core needle biopsies as above    Medications:  Levothyroxine: none  Vitamin D supplementation: weekly ergocalciferol  Lithium, biotin, amiodarone, iodinated contrast: none  Calcium supplements or calcimimetics: none    Symptoms:   Thyroid symptoms: denies overt hyperthyroid or hypothyroid symptoms changes  Parathyroid screening: calcium levels normal, history of left superior parathyroidectomy in   Compressive symptoms: denies dysphagia, globus sensation, compression symptoms, or anterior neck pain.    Voice symptoms: reports new onset hoarseness and voice changes after prior RFA  Also reports new right eye symptoms and ptosis after prior RFA    Surgical risk factors:  Risk of concurrent parathyroid disease, low; prior history of parathyroidectomy  History of neck radiation: none  Prior neck surgery: yes  Prior gastric or bypass surgery: not assessed  Activity level and functional status: not assessed  Cardiovascular risk: not assessed    Bone Health:  Bone mineral density: normal in     Family history:  No family history of endocrinopathies or endocrine cancers including thyroid disease, thyroid cancer, parathyroid disease, hypercalcemia.      LABORATORY STUDIES:  I personally and independently reviewed relevant lab test results, including the following:    Calcium   Date Value Ref Range Status   2025 9.3 8.6 - 10.4 mg/dL Final   10/30/2024 9.7 8.6 - 10.4 mg/dL Final   2024 9.3 8.6 - 10.4 mg/dL Final     Albumin Level   Date Value Ref Range Status   2023 4.1 3.6 - 5.1 g/dL Final   2022 4.5 3.6 - 5.1 g/dL Final     Thyroperoxidase Antibodies   Date Value Ref Range Status   2016 <6.0 <6.0 IU/mL Final        PAST MEDICAL HISTORY:  Problem List[1]     PAST SURGICAL HISTORY:  Past Surgical History:   Procedure Laterality Date     SECTION      HYSTERECTOMY      MYOMECTOMY          MEDICATIONS:  Current  "Medications[2]    ALLERGIES:  Review of patient's allergies indicates:  No Known Allergies    SOCIAL HISTORY:  Social History[3]     FAMILY HISTORY:  Family History   Problem Relation Name Age of Onset    Diabetes Mother      Heart failure Mother      Diabetes Father      Diabetes Sister      Kidney failure Brother      Goiter Sister      No Known Problems Sister      Diabetes Brother      No Known Problems Brother           REVIEW OF SYSTEMS:  A detailed review of systems was reviewed with the patient, pertinent positives and negatives are presented in the note and is otherwise negative.    PHYSICAL EXAMINATION:  Vital Signs: /80   Ht 5' 2" (1.575 m)   Wt 78.6 kg (173 lb 4.5 oz)   BMI 31.69 kg/m²     Constitutional: no acute distress, comfortable, well appearing  HENT: right eye ptosis, well healed lower transverse cervical scar  Neurologic: voice raspy  Psychiatric: affect normal    IMAGING STUDIES:  I reviewed limited images from focused ultrasound examinations brought to the office by the patient on disks, on review of several images, there are small nodules that are not suspicious appearing and would not meet criteria for FNA, ablation, or surgery.    US Thyroid 05/15/2025    Narrative  Images from the original result were not included.  Thyroid_Neck Soft Tissue:  Thyroid, neck and soft tissue multiple sonography images were obtained  using 15 MHz linear transducer. All images were placed in the chart    Findings:    Right lobe Nodule/Isthmus post RFA  Size: 1.43 x 0.72 x 0.51 cm - Volume = 0.26 mL (VRR = -78.89% )  Composition: solid/almost solid (2 points)  Echogenicity: hypoechoic (2 points)  Shape: wider than tall (0 points)  Margins: Regular (0 points)  Echogenic foci: none (0 points)  Total points: 4  ACR TI-RADS category: TR4  *  No suspicious lymphadenopathy noted in the right, left lateral, or central  compartments of the neck.    CYTOLOGY:   No results found for: "FPATHDX"      IMPRESSION AND " PLAN:  Multiple thyroid nodules  Assessment & Plan:  I had the pleasure of seeing Ms. Jasmine in endocrine surgical consultation regarding her multiple thyroid nodules, several of which have undergone multiple fine needle aspiration and core needle biopsies as well as multiple radiofrequency ablations.  After review of the available ultrasound images and reviewing the symptoms she has experienced after the radiofrequency ablations, I think the risk of malignancy is quite low and additional procedures may cause additional complications.    We will arrange for her to have a comprehensive thyroid ultrasound to formally document all of the thyroid nodules and have placed an ENT referral for laryngoscopy and vocal cord evaluation.  She declined a referral to oculoplastics.  I will see her back on an as needed basis and encouraged her to reach out with any questions or concerns as I would be happy to see her back at any time to discuss further.      Jennifer Ureña MD, FACS  Staff Surgeon  Endocrine Surgery  6/16/25     I spent a total of 20 minutes on the day of the visit.This includes face to face time and non-face to face time preparing to see the patient (eg, review of tests), obtaining and/or reviewing separately obtained history, documenting clinical information in the electronic or other health record, independently interpreting results and communicating results to the patient/family/caregiver, or care coordinator.        [1]   Patient Active Problem List  Diagnosis    Multiple thyroid nodules    Shortness of breath    Hypertension    Hypercholesterolemia    Prediabetes    Mild obesity    Hyperparathyroidism   [2]   Current Outpatient Medications   Medication Sig Dispense Refill    bromfenac (PROLENSA) 0.07 % Drop Apply to eye.      co-enzyme Q-10 50 mg capsule Take 50 mg by mouth once daily.      dorzolamide (TRUSOPT) 2 % ophthalmic solution 1 drop 3 (three) times daily.      ergocalciferol (ERGOCALCIFEROL)  50,000 unit Cap Take 50,000 Units by mouth every 7 days.      FLAXSEED OIL ORAL Take by mouth.      fluticasone propionate (FLONASE) 50 mcg/actuation nasal spray 1 spray by Each Nostril route once daily.      NORVASC 5 mg tablet TAKE 1 TABLET BY MOUTH EVERY DAY 90 tablet 3    pantoprazole (PROTONIX) 40 MG tablet Take 40 mg by mouth every morning. Sometimes      plecanatide (TRULANCE) 3 mg Tab Take by mouth.      polyethylene glycol (GLYCOLAX) 17 gram PwPk Take by mouth.      prednisoLONE (ORAPRED ODT) 10 MG disintegrating tablet Take 10 mg by mouth once daily.      famotidine (PEPCID) 20 MG tablet Take 20 mg by mouth. sometimes       No current facility-administered medications for this visit.   [3]   Social History  Tobacco Use    Smoking status: Never    Smokeless tobacco: Never   Substance Use Topics    Alcohol use: No     Alcohol/week: 0.0 standard drinks of alcohol

## 2025-06-16 ENCOUNTER — OFFICE VISIT (OUTPATIENT)
Dept: SURGERY | Facility: CLINIC | Age: 85
End: 2025-06-16
Payer: MEDICARE

## 2025-06-16 ENCOUNTER — OFFICE VISIT (OUTPATIENT)
Dept: ENDOCRINOLOGY | Facility: CLINIC | Age: 85
End: 2025-06-16
Payer: MEDICARE

## 2025-06-16 VITALS
SYSTOLIC BLOOD PRESSURE: 133 MMHG | BODY MASS INDEX: 31.69 KG/M2 | HEART RATE: 73 BPM | WEIGHT: 173.31 LBS | DIASTOLIC BLOOD PRESSURE: 80 MMHG

## 2025-06-16 VITALS
SYSTOLIC BLOOD PRESSURE: 133 MMHG | HEIGHT: 62 IN | DIASTOLIC BLOOD PRESSURE: 80 MMHG | BODY MASS INDEX: 31.89 KG/M2 | WEIGHT: 173.31 LBS

## 2025-06-16 DIAGNOSIS — E04.2 MULTINODULAR GOITER: Primary | ICD-10-CM

## 2025-06-16 DIAGNOSIS — E04.2 MULTIPLE THYROID NODULES: Primary | ICD-10-CM

## 2025-06-16 DIAGNOSIS — R49.0 HOARSENESS OF VOICE: ICD-10-CM

## 2025-06-16 DIAGNOSIS — E04.1 THYROID NODULE: ICD-10-CM

## 2025-06-16 PROBLEM — E03.9 HYPOTHYROIDISM: Status: RESOLVED | Noted: 2021-11-30 | Resolved: 2025-06-16

## 2025-06-16 PROCEDURE — 3075F SYST BP GE 130 - 139MM HG: CPT | Mod: CPTII,S$GLB,, | Performed by: STUDENT IN AN ORGANIZED HEALTH CARE EDUCATION/TRAINING PROGRAM

## 2025-06-16 PROCEDURE — 3079F DIAST BP 80-89 MM HG: CPT | Mod: CPTII,S$GLB,, | Performed by: INTERNAL MEDICINE

## 2025-06-16 PROCEDURE — 1101F PT FALLS ASSESS-DOCD LE1/YR: CPT | Mod: CPTII,S$GLB,, | Performed by: INTERNAL MEDICINE

## 2025-06-16 PROCEDURE — 3079F DIAST BP 80-89 MM HG: CPT | Mod: CPTII,S$GLB,, | Performed by: STUDENT IN AN ORGANIZED HEALTH CARE EDUCATION/TRAINING PROGRAM

## 2025-06-16 PROCEDURE — 1159F MED LIST DOCD IN RCRD: CPT | Mod: CPTII,S$GLB,, | Performed by: STUDENT IN AN ORGANIZED HEALTH CARE EDUCATION/TRAINING PROGRAM

## 2025-06-16 PROCEDURE — 99202 OFFICE O/P NEW SF 15 MIN: CPT | Mod: S$GLB,,, | Performed by: STUDENT IN AN ORGANIZED HEALTH CARE EDUCATION/TRAINING PROGRAM

## 2025-06-16 PROCEDURE — 99204 OFFICE O/P NEW MOD 45 MIN: CPT | Mod: S$GLB,,, | Performed by: INTERNAL MEDICINE

## 2025-06-16 PROCEDURE — 1126F AMNT PAIN NOTED NONE PRSNT: CPT | Mod: CPTII,S$GLB,, | Performed by: INTERNAL MEDICINE

## 2025-06-16 PROCEDURE — 3288F FALL RISK ASSESSMENT DOCD: CPT | Mod: CPTII,S$GLB,, | Performed by: STUDENT IN AN ORGANIZED HEALTH CARE EDUCATION/TRAINING PROGRAM

## 2025-06-16 PROCEDURE — 3288F FALL RISK ASSESSMENT DOCD: CPT | Mod: CPTII,S$GLB,, | Performed by: INTERNAL MEDICINE

## 2025-06-16 PROCEDURE — 1126F AMNT PAIN NOTED NONE PRSNT: CPT | Mod: CPTII,S$GLB,, | Performed by: STUDENT IN AN ORGANIZED HEALTH CARE EDUCATION/TRAINING PROGRAM

## 2025-06-16 PROCEDURE — 1101F PT FALLS ASSESS-DOCD LE1/YR: CPT | Mod: CPTII,S$GLB,, | Performed by: STUDENT IN AN ORGANIZED HEALTH CARE EDUCATION/TRAINING PROGRAM

## 2025-06-16 PROCEDURE — G2211 COMPLEX E/M VISIT ADD ON: HCPCS | Mod: S$GLB,,, | Performed by: INTERNAL MEDICINE

## 2025-06-16 PROCEDURE — 99999 PR PBB SHADOW E&M-EST. PATIENT-LVL IV: CPT | Mod: PBBFAC,,, | Performed by: INTERNAL MEDICINE

## 2025-06-16 PROCEDURE — 99999 PR PBB SHADOW E&M-EST. PATIENT-LVL III: CPT | Mod: PBBFAC,,, | Performed by: STUDENT IN AN ORGANIZED HEALTH CARE EDUCATION/TRAINING PROGRAM

## 2025-06-16 PROCEDURE — 3075F SYST BP GE 130 - 139MM HG: CPT | Mod: CPTII,S$GLB,, | Performed by: INTERNAL MEDICINE

## 2025-06-16 RX ORDER — PREDNISOLONE 10 MG/1
10 TABLET, ORALLY DISINTEGRATING ORAL DAILY
COMMUNITY

## 2025-06-16 RX ORDER — DORZOLAMIDE HCL 20 MG/ML
1 SOLUTION/ DROPS OPHTHALMIC 3 TIMES DAILY
COMMUNITY

## 2025-06-16 RX ORDER — BROMFENAC SODIUM 0.7 MG/ML
SOLUTION/ DROPS OPHTHALMIC
COMMUNITY

## 2025-06-16 NOTE — PROGRESS NOTES
"NEW PATIENT VISIT    Subjective:      Chief Complaint: Initial visit for thyroid nodules    HPI: Xuan Jasmine is a 84 y.o. female with multiple thyroid nodules.      Past Medical History:   Diagnosis Date    GERD (gastroesophageal reflux disease)     Hyperparathyroidism, unspecified     Hypertension     Thyroid nodule        With regards to the thyroid nodule:    Nodules felt by her doctor when she was in her 20s. Went to [place] and she was placed on Synthroid. She took it for about 20 years, but then in her 40s they took her off it. TSH has been normal. Not currently on thyroid hormone replacement.  She has had multiple ultrasounds done over the years.  More recently, she has undergone biopsies of three nodules along with RFA x 3 since 2021.  Her surgeon suggested she undergo an additional RFA procedure based on "growth" of one of the nodules from 1.2 > 1.4 cm, and she is here for a second opinion.  I independently reviewed the images that she provided from the most recent ultrasound as well as her previous ones.  The nodule measured at 1.4 cm appears to be different from the one that was previously ablated.     After her most recent RFA procedure, she began to complain of right eye ptosis as well as new onset voice hoarseness that was not present prior to the procedure.  She was reassured that this was coincidental, but she presents a paper that shows a small percentage of people with ocular complications after RFA, so she feels it is related.  She would like to avoid having any additional procedures done if possible.      History per records:  History of left superior parathyroidectomy in 4/2022.     RFA of right thyroid nodule and CNB of the right thyroid nodule on 7/2/2024. She reported new onset right eye droopiness and trouble seeing since procedure.     She is s/p RFA & CNB isthmus nodule on 12/8/2022 that measured: 1.86 x 1.74 x 0.77 cm on 10/14/2022 that decreased in size to measured:0.88 x 0.87 x " 0.35 cm on 8/7/2023 neck u/s with 83% VRR.     She is s/p RFA and core needle biopsy on 12/21/2021 of the right thyroid nodule measured 3.05 x 1.49 x 1.46cm on 11/8/2021 neck U/S that decreased in size to measure 0.9 x 0.84 x 0.83 cm on 8/7/2023 neck U/S with 91 % VRR (FNA: Non diagnostic, FNA 11/8/2021: Benign, CNB on 12/21/2021: Benign).       Compressive Symptoms:  No  Yes  []    [x] Anterior neck pressure  [x]    [] Dysphagia  []    [x] Voice changes - reports hoarseness since RFA procedure in July 2024.    Risk Factors:  No   Yes  [x]    [] Radiation to head or neck for any treatment of cancer or exposure to radiation  [x]    [] Tobacco use  [x]    [] Family history of thyroid cancer        Objective:     Vitals:    06/16/25 0843   BP: 133/80   Pulse: 73         BP Readings from Last 5 Encounters:   06/16/25 133/80   06/16/25 133/80   03/04/24 120/86   03/31/23 128/68   09/30/22 128/66         Physical Exam  Vitals and nursing note reviewed.   Constitutional:       General: She is not in acute distress.     Appearance: She is well-developed. She is not ill-appearing.   HENT:      Head: Normocephalic and atraumatic.   Neck:      Thyroid: Thyroid tenderness (She has tenderness on mild palpation over the right lower thyroid lobe near the edge of the parathyroidectomy scar) present. No thyroid mass or thyromegaly.      Trachea: No tracheal deviation.   Pulmonary:      Effort: Pulmonary effort is normal. No respiratory distress.   Neurological:      Mental Status: She is alert and oriented to person, place, and time.      Coordination: Coordination normal.   Psychiatric:         Mood and Affect: Mood normal.         Behavior: Behavior normal.           Wt Readings from Last 3 Encounters:   06/16/25 0909 78.6 kg (173 lb 4.5 oz)   06/16/25 0843 78.6 kg (173 lb 4.5 oz)   03/04/24 1326 83.7 kg (184 lb 10.2 oz)       Assessment/Plan:     Multiple thyroid nodules  I have independently reviewed the images that she provided  from the most recent thyroid ultrasound as well as prior thyroid ultrasounds.  The nodules appear to be quite small after RFA, so I do not think additional RFA is indicated or would be beneficial at this point.  I recommended we do a diagnostic ultrasound in our clinic to get a better feel for all of the thyroid nodules that are still present as well as their ultrasound characteristics.  We will have a high threshold for biopsy or other invasive procedures given that these nodules have been present since her 20s and are very likely to be benign.    Hoarseness of voice  We will refer to ENT for vocal cord assessment.    Follow-up yearly or sooner as needed

## 2025-06-16 NOTE — ASSESSMENT & PLAN NOTE
I have independently reviewed the images that she provided from the most recent thyroid ultrasound as well as prior thyroid ultrasounds.  The nodules appear to be quite small after RFA, so I do not think additional RFA is indicated or would be beneficial at this point.  I recommended we do a diagnostic ultrasound in our clinic to get a better feel for all of the thyroid nodules that are still present as well as their ultrasound characteristics.  We will have a high threshold for biopsy or other invasive procedures given that these nodules have been present since her 20s and are very likely to be benign.

## 2025-06-16 NOTE — ASSESSMENT & PLAN NOTE
I had the pleasure of seeing Ms. Jasmine in endocrine surgical consultation regarding her multiple thyroid nodules, several of which have undergone multiple fine needle aspiration and core needle biopsies as well as multiple radiofrequency ablations.  After review of the available ultrasound images and reviewing the symptoms she has experienced after the radiofrequency ablations, I think the risk of malignancy is quite low and additional procedures may cause additional complications.    We will arrange for her to have a comprehensive thyroid ultrasound to formally document all of the thyroid nodules and have placed an ENT referral for laryngoscopy and vocal cord evaluation.  She declined a referral to oculoplastics.  I will see her back on an as needed basis and encouraged her to reach out with any questions or concerns as I would be happy to see her back at any time to discuss further.

## 2025-07-22 ENCOUNTER — OFFICE VISIT (OUTPATIENT)
Dept: OTOLARYNGOLOGY | Facility: CLINIC | Age: 85
End: 2025-07-22
Payer: MEDICARE

## 2025-07-22 VITALS — DIASTOLIC BLOOD PRESSURE: 84 MMHG | SYSTOLIC BLOOD PRESSURE: 142 MMHG

## 2025-07-22 DIAGNOSIS — R49.0 HOARSENESS OF VOICE: Primary | ICD-10-CM

## 2025-07-22 PROCEDURE — 99999 PR PBB SHADOW E&M-EST. PATIENT-LVL III: CPT | Mod: PBBFAC,,, | Performed by: STUDENT IN AN ORGANIZED HEALTH CARE EDUCATION/TRAINING PROGRAM

## 2025-07-22 NOTE — PROGRESS NOTES
Note to patients: In accordance with the  Century Cures Act, patients are now granted immediate electronic access to their medical records. This note is primarily intended for communication among medical professionals. As a result, it may incorporate medical terminology, abbreviations, or language that could appear blunt or unfamiliar. If you have questions about this document, we encourage you to discuss it with your physician.      [facility]  [facility]    Patient: Xuan Jasmine    : 1940    MRN: [id number]  Primary Care Provider: Roseline Amos MD  Date of Encounter: 2025    DIAGNOSIS: thyroid nodules and hoarseness      CC:   Chief Complaint   Patient presents with    Hoarseness of voice        HPI:   Xuan Jasmine is a 84 y.o. female who presents today with voice hoarseness. She reports progressive voice changes that began after radio frequency ablations approximately one year ago. She underwent left superior parathyroidectomy in 2022 and then had RFA first in 2022 and again 2024. A third proposed ablation was declined after seeking a second opinion from Alexander Ziegler and Niranjan who recommended against the procedure. Her voice has become hoarse and gravelly with intermittent severity. She experiences vocal fatigue when talking or singing for prolonged periods. She notes her voice pitch is lower compared to her pre-ablation vocal range. She reports ongoing swallowing difficulties, managing more easily with liquids but experiencing challenges with thicker consistencies. She denies acute distress with swallowing. She does have reflux symptoms. She takes Pepcid as needed, occasionally using it daily, preferring it over Pantoprazole. She also takes Dulcolax 1 tablet and Miralax daily.     She reports ongoing tinnitus. A hearing test this year showed no further hearing deterioration. She owns hearing aids but does not wear them due to concerns  about dependency. Her speech comprehension varies depending on the speaker.     Patient lives in [place].    ALLERGIES:  Review of patient's allergies indicates:  No Known Allergies      MEDICATIONS:  Current Medications[1]    PAST MEDICAL HISTORY:  Past Medical History:   Diagnosis Date    GERD (gastroesophageal reflux disease)     Hyperparathyroidism, unspecified     Hypertension     Thyroid nodule         PAST SURGICAL HISTORY:  Past Surgical History:   Procedure Laterality Date     SECTION      HYSTERECTOMY      MYOMECTOMY          FAMILY HISTORY:  Family History   Problem Relation Name Age of Onset    Diabetes Mother      Heart failure Mother      Diabetes Father      Diabetes Sister      Kidney failure Brother      Goiter Sister      No Known Problems Sister      Diabetes Brother      No Known Problems Brother         SOCIAL HISTORY:  Social History[2]  See above substance history    REVIEW OF SYSTEMS:   Comprehensive review of systems was discussed with the patient.  It is positive only for the above complaints.    PHYSICAL EXAMINATION:  Blood pressure (!) 142/84.    Constitutional: Non-toxic appearing.   Psychiatric: Appropriate mood and affect. Cooperative.  Voice: Non-dysphonic, speaking in full sentences.   Neurologic: Cranial nerves grossly intact, no focal deficits.  Head and face: Salivary glands are not enlarged. Face is symmetric. CN VII strength intact.  Skin: No concerning skin lesions.   Eyes: Vision grossly intact, bilateral extraocular movements intact  Ears: Bilateral pinna, mastoid, external ear canal normal. Hearing intact. Tm clear AU.  Nose: External nose appears normal.   Lips: No ulcers or lesions  Oral cavity: Mucosa is pink and moist. Buccal mucosa, gingiva, anterior tongue, floor of mouth, and hard palate appear normal. No leukoplakia, erythroplakia, ulceration, masses or lesions.  Oropharynx: Mucosa is pink and moist. Soft palate and base of tongue are normal. Posterior  pharyngeal wall normal. Tonsils are normal. No lesions.  Neck: Soft and flat,  no masses or lymphadenopathy. No palpable thyroid enlargement or nodules.  Respiratory: Chest expansion symmetric, no audible stridor or stertor. Breathing is unlabored. No active cough.    PROCEDURES:    FLEXIBLE LARYNGOSCOPY  Provider: Keren Malhotra MD  Indication: Hoarseness  The patient was unable to tolerate mirror laryngoscopy.  The procedure was explained to the patient and verbal consent was obtained.   Anesthesia: topical lidocaine and neosynephrine applied within one or both nares with an atomizer    The scope was introduced in the usual fashion.    Findings:  Mucosa: normal  Inferior turbinates: no polypoid changes or hypertrophy  Septum: no lesion or ulcer  Middle meatus: no purulence or polypoid change  Nasopharynx:  Adenoids: normal  Fossa of Rosenmuller: normal  Eustachian tubes: normal  Superior and posterior pharyngeal walls: normal   Epiglottis, vallecula, and base of tongue: normal   Pyriform sinuses: no pooling of secretions or saliva in pyriform sinuses, mucosa normal  False vocal cords, arytenoids, aryepiglottic folds: normal  True vocal cords are symmetrically mobile on phonation and inspiration with mild atrophy  Laryngeal sensation appears intact. There are no masses or ulcerations.     The scope was withdrawn. The patient tolerated the procedure well with no complications.      DATA REVIEWED:     LABORATORY:      Latest Ref Rng & Units 9/26/2024     3:11 PM 10/30/2024     4:42 PM 5/1/2025     8:29 AM   Thyroid Labs   Sodium 135 - 146 mmol/L  139     Potassium 3.5 - 5.3 mmol/L  3.8     Carbon Dioxide 20 - 32 mmol/L  25     Glucose 65 - 99 mg/dL  111     Blood Urea Nitrogen 7 - 25 mg/dL  15     Creatinine 0.60 - 0.95 mg/dL  0.83     Calcium 8.6 - 10.4 mg/dL 9.3  9.7  9.3         PATHOLOGY:  7/2024  Right thyroid lobe nodule, 2.42 cm, U/S guided FNA:  - Benign.  Benign follicular nodule.  Correlate clinically and  with ancillary studies.    IMAGING:  US 5/2025  Right lobe Nodule/Isthmus post RFA   Size: 1.43 x 0.72 x 0.51 cm - Volume = 0.26 mL (VRR = -78.89% )   Composition: solid/almost solid (2 points)   Echogenicity: hypoechoic (2 points)   Shape: wider than tall (0 points)   Margins: Regular (0 points)   Echogenic foci: none (0 points)   Total points: 4   ACR TI-RADS category: TR4   *   No suspicious lymphadenopathy noted in the right, left lateral, or central   compartments of the neck.     RADIOLOGY REVIEWED:  I have independently viewed and agree with the US images and reports which demonstrate nodule as biopsied and described above.    ASSESSMENT AND PLAN:  1. Hoarseness of voice       Xuan Jasmine is a 84 y.o. female with hoarseness following multiple thyroid ablations as well as parathyroidectomy. No evidence of recurrent laryngeal nerve injury although possible superior laryngeal nerve injury based on timing of symptoms and interventions. Also with evidence of presbylaryngis.     HOARSENESS  - Performed laryngoscopy to assess vocal cord function and movement - vocal cords moving normally although with some  - Referred to voice therapy to strengthen voice and improve quality.    GASTROESOPHAGEAL REFLUX DISEASE:  - Considered acid reflux as contributing factor to voice issues.  - Informed patient that acid from stomach can affect laryngeal function when it reaches that area.  - Started Pepcid daily for a few weeks to address potential acid reflux contributing to voice issues.     THYROID NODULES  - Follow-up with Alexander Ziegler and Niranjan as recommended for management         Orders Placed This Encounter    Ambulatory referral/consult to Speech Therapy        Patient encouraged to call with any questions, concerns, or new or worsening symptoms.     Follow up PRN           [1]   Current Outpatient Medications:     bromfenac (PROLENSA) 0.07 % Drop, Apply to eye., Disp: , Rfl:     co-enzyme Q-10 50 mg capsule, Take  50 mg by mouth once daily., Disp: , Rfl:     dorzolamide (TRUSOPT) 2 % ophthalmic solution, 1 drop 3 (three) times daily., Disp: , Rfl:     ergocalciferol (ERGOCALCIFEROL) 50,000 unit Cap, Take 50,000 Units by mouth every 7 days., Disp: , Rfl:     FLAXSEED OIL ORAL, Take by mouth., Disp: , Rfl:     fluticasone propionate (FLONASE) 50 mcg/actuation nasal spray, 1 spray by Each Nostril route once daily., Disp: , Rfl:     NORVASC 5 mg tablet, TAKE 1 TABLET BY MOUTH EVERY DAY, Disp: 90 tablet, Rfl: 3    plecanatide (TRULANCE) 3 mg Tab, Take by mouth., Disp: , Rfl:     polyethylene glycol (GLYCOLAX) 17 gram PwPk, Take by mouth., Disp: , Rfl:     prednisoLONE (ORAPRED ODT) 10 MG disintegrating tablet, Take 10 mg by mouth once daily., Disp: , Rfl:     famotidine (PEPCID) 20 MG tablet, Take 20 mg by mouth. sometimes, Disp: , Rfl:     pantoprazole (PROTONIX) 40 MG tablet, Take 40 mg by mouth every morning. Sometimes, Disp: , Rfl:   [2]   Social History  Socioeconomic History    Marital status: Single   Tobacco Use    Smoking status: Never    Smokeless tobacco: Never   Substance and Sexual Activity    Alcohol use: No     Alcohol/week: 0.0 standard drinks of alcohol     Social Drivers of Health     Financial Resource Strain: Low Risk  (6/9/2025)    Overall Financial Resource Strain (CARDIA)     Difficulty of Paying Living Expenses: Not hard at all   Food Insecurity: No Food Insecurity (6/9/2025)    Hunger Vital Sign     Worried About Running Out of Food in the Last Year: Never true     Ran Out of Food in the Last Year: Never true   Transportation Needs: No Transportation Needs (6/9/2025)    PRAPARE - Transportation     Lack of Transportation (Medical): No     Lack of Transportation (Non-Medical): No   Physical Activity: Insufficiently Active (6/9/2025)    Exercise Vital Sign     Days of Exercise per Week: 1 day     Minutes of Exercise per Session: 30 min   Stress: No Stress Concern Present (6/9/2025)    [facility] - Occupational Stress Questionnaire     Feeling of Stress : Not at all   Housing Stability: Low Risk  (6/9/2025)    Housing Stability Vital Sign     Unable to Pay for Housing in the Last Year: No     Number of Times Moved in the Last Year: 0     Homeless in the Last Year: No

## 2025-07-31 ENCOUNTER — PATIENT MESSAGE (OUTPATIENT)
Dept: ENDOCRINOLOGY | Facility: CLINIC | Age: 85
End: 2025-07-31
Payer: MEDICARE

## 2025-07-31 ENCOUNTER — TELEPHONE (OUTPATIENT)
Dept: SPEECH THERAPY | Facility: HOSPITAL | Age: 85
End: 2025-07-31
Payer: MEDICARE

## 2025-07-31 ENCOUNTER — HOSPITAL ENCOUNTER (OUTPATIENT)
Dept: ENDOCRINOLOGY | Facility: CLINIC | Age: 85
Discharge: HOME | End: 2025-07-31
Attending: INTERNAL MEDICINE
Payer: MEDICARE

## 2025-07-31 DIAGNOSIS — E04.1 THYROID NODULE: ICD-10-CM

## 2025-07-31 PROCEDURE — 76536 US EXAM OF HEAD AND NECK: CPT | Mod: S$GLB,,, | Performed by: INTERNAL MEDICINE
